# Patient Record
Sex: FEMALE | Race: WHITE | NOT HISPANIC OR LATINO | URBAN - METROPOLITAN AREA
[De-identification: names, ages, dates, MRNs, and addresses within clinical notes are randomized per-mention and may not be internally consistent; named-entity substitution may affect disease eponyms.]

---

## 2017-01-26 ENCOUNTER — APPOINTMENT (OUTPATIENT)
Dept: URBAN - METROPOLITAN AREA CLINIC 200 | Age: 72
Setting detail: DERMATOLOGY
End: 2017-02-02

## 2017-01-26 DIAGNOSIS — B02.9 ZOSTER WITHOUT COMPLICATIONS: ICD-10-CM

## 2017-01-26 PROBLEM — L08.9 LOCAL INFECTION OF THE SKIN AND SUBCUTANEOUS TISSUE, UNSPECIFIED: Status: ACTIVE | Noted: 2017-01-26

## 2017-01-26 PROCEDURE — OTHER PRESCRIPTION: OTHER

## 2017-01-26 PROCEDURE — 99212 OFFICE O/P EST SF 10 MIN: CPT

## 2017-01-26 RX ORDER — VALACYCLOVIR HYDROCHLORIDE 1 G/1
TABLET, FILM COATED ORAL
Qty: 21 | Refills: 0 | Status: ERX | COMMUNITY
Start: 2017-01-26

## 2017-01-26 ASSESSMENT — LOCATION SIMPLE DESCRIPTION DERM: LOCATION SIMPLE: RIGHT LIP

## 2017-01-26 ASSESSMENT — LOCATION DETAILED DESCRIPTION DERM: LOCATION DETAILED: RIGHT UPPER CUTANEOUS LIP

## 2017-01-26 ASSESSMENT — LOCATION ZONE DERM: LOCATION ZONE: LIP

## 2017-04-11 ENCOUNTER — APPOINTMENT (OUTPATIENT)
Dept: URBAN - METROPOLITAN AREA CLINIC 200 | Age: 72
Setting detail: DERMATOLOGY
End: 2017-04-18

## 2017-04-11 DIAGNOSIS — L82.0 INFLAMED SEBORRHEIC KERATOSIS: ICD-10-CM

## 2017-04-11 PROBLEM — D48.5 NEOPLASM OF UNCERTAIN BEHAVIOR OF SKIN: Status: ACTIVE | Noted: 2017-04-11

## 2017-04-11 PROBLEM — I10 ESSENTIAL (PRIMARY) HYPERTENSION: Status: ACTIVE | Noted: 2017-04-11

## 2017-04-11 PROCEDURE — 11100: CPT

## 2017-04-11 PROCEDURE — OTHER BIOPSY BY SHAVE METHOD: OTHER

## 2017-04-11 ASSESSMENT — LOCATION DETAILED DESCRIPTION DERM: LOCATION DETAILED: LEFT INFERIOR LATERAL FOREHEAD

## 2017-04-11 ASSESSMENT — LOCATION SIMPLE DESCRIPTION DERM: LOCATION SIMPLE: LEFT FOREHEAD

## 2017-04-11 ASSESSMENT — LOCATION ZONE DERM: LOCATION ZONE: FACE

## 2017-04-11 NOTE — PROCEDURE: BIOPSY BY SHAVE METHOD
Type Of Destruction Used: Curettage
Dressing: bandage
Render Post-Care Instructions In Note?: no
Wound Care: Vaseline
consent was obtained and risks were reviewed including but not limited to scarring, infection, bleeding, scabbing, incomplete removal, nerve damage and allergy to anesthesia.
Additional Anesthesia Volume In Cc (Will Not Render If 0): 0
Biopsy Method: Personna blade
Notification Instructions: Patient will be notified of biopsy results. However, patient instructed to call the office if not contacted within 2 weeks.
Anesthesia Volume In Cc (Will Not Render If 0): 0.1
Detail Level: Detailed
Hemostasis: Drysol
Post-Care Instructions: I reviewed with the patient in detail post-care instructions. Patient is to keep the biopsy site dry overnight, and then apply bacitracin twice daily until healed. Patient may apply hydrogen peroxide soaks to remove any crusting.
Billing Type: Third-Party Bill
Anesthesia Type: 1% lidocaine with epinephrine
Biopsy Type: H and E
Size Of Lesion In Cm: 1.2

## 2017-05-11 ENCOUNTER — APPOINTMENT (OUTPATIENT)
Dept: URBAN - METROPOLITAN AREA CLINIC 200 | Age: 72
Setting detail: DERMATOLOGY
End: 2017-05-17

## 2017-05-11 DIAGNOSIS — L57.8 OTHER SKIN CHANGES DUE TO CHRONIC EXPOSURE TO NONIONIZING RADIATION: ICD-10-CM

## 2017-05-11 PROCEDURE — OTHER COUNSELING: OTHER

## 2017-05-11 PROCEDURE — 99213 OFFICE O/P EST LOW 20 MIN: CPT

## 2017-05-11 ASSESSMENT — LOCATION SIMPLE DESCRIPTION DERM: LOCATION SIMPLE: RIGHT UPPER BACK

## 2017-05-11 ASSESSMENT — LOCATION ZONE DERM: LOCATION ZONE: TRUNK

## 2017-05-11 ASSESSMENT — LOCATION DETAILED DESCRIPTION DERM: LOCATION DETAILED: RIGHT INFERIOR UPPER BACK

## 2018-06-20 ENCOUNTER — OFFICE VISIT (OUTPATIENT)
Dept: UROGYNECOLOGY | Facility: CLINIC | Age: 73
End: 2018-06-20
Payer: COMMERCIAL

## 2018-06-20 VITALS — WEIGHT: 156 LBS

## 2018-06-20 DIAGNOSIS — N30.10 INTERSTITIAL CYSTITIS: ICD-10-CM

## 2018-06-20 DIAGNOSIS — N39.46 MIXED STRESS AND URGE URINARY INCONTINENCE: ICD-10-CM

## 2018-06-20 DIAGNOSIS — R15.9 FECAL INCONTINENCE ALTERNATING WITH CONSTIPATION: ICD-10-CM

## 2018-06-20 DIAGNOSIS — K59.00 FECAL INCONTINENCE ALTERNATING WITH CONSTIPATION: ICD-10-CM

## 2018-06-20 DIAGNOSIS — R30.0 DYSURIA: ICD-10-CM

## 2018-06-20 DIAGNOSIS — R14.0 ABDOMINAL BLOATING: Primary | ICD-10-CM

## 2018-06-20 PROCEDURE — 51700 IRRIGATION OF BLADDER: CPT | Performed by: OBSTETRICS & GYNECOLOGY

## 2018-06-20 PROCEDURE — 99244 OFF/OP CNSLTJ NEW/EST MOD 40: CPT | Mod: 25 | Performed by: OBSTETRICS & GYNECOLOGY

## 2018-06-20 RX ORDER — OMEPRAZOLE 20 MG/1
20 CAPSULE, DELAYED RELEASE ORAL
COMMUNITY

## 2018-06-20 RX ORDER — BUSPIRONE HYDROCHLORIDE 5 MG/1
10 TABLET ORAL AS NEEDED
COMMUNITY
End: 2022-07-14

## 2018-06-20 RX ORDER — ROSUVASTATIN CALCIUM 5 MG/1
10 TABLET, COATED ORAL DAILY
COMMUNITY

## 2018-06-20 RX ORDER — FLUOXETINE HYDROCHLORIDE 20 MG/1
20 CAPSULE ORAL 2 TIMES DAILY
COMMUNITY

## 2018-06-20 RX ORDER — AMLODIPINE BESYLATE 10 MG/1
10 TABLET ORAL DAILY
COMMUNITY

## 2018-06-20 RX ORDER — LEVOTHYROXINE SODIUM 88 UG/1
75 TABLET ORAL
COMMUNITY
End: 2021-04-14

## 2018-06-20 RX ORDER — LISINOPRIL 5 MG/1
5 TABLET ORAL DAILY
COMMUNITY

## 2018-06-20 ASSESSMENT — ENCOUNTER SYMPTOMS
BACK PAIN: 1
DIARRHEA: 1
FATIGUE: 1
HEMATURIA: 0
DIFFICULTY URINATING: 0
NERVOUS/ANXIOUS: 1
DYSURIA: 1
FREQUENCY: 1
ABDOMINAL PAIN: 0
VOMITING: 0
BLOOD IN STOOL: 0
FEVER: 0
NAUSEA: 0
CHILLS: 0
CONSTIPATION: 1
APPETITE CHANGE: 0
FLANK PAIN: 1

## 2018-06-20 NOTE — PATIENT INSTRUCTIONS
If you have any problems or concerns, call our office at [phone].  If you think you are having a medical emergency, please call 911.     Bowel Purge with PEG/Electrolyte-Generic, GoLYTELY, or NuLYTELY    You may have a problem emptying your bowels completely or if you have an x-ray that shows your colon is full of stool and you have overflow diarrhea, your doctor may have complete a bowel purge.  The purpose of a bowel purge is to empty the colon of built up stool, this is done before starting a new laxative or simply because your colon is overloaded with stool. Once you start taking the medication for the purge, you will need to be by a bathroom for at least a day or two.    What supplies are needed for your bowel purge?  Fill your prescription for PEG/Electrolyte-Generic®, GoLYTELY®, or NuLYTELY® at your local pharmacy. You can flavor the medication with Crystal Light Lemonade® to make it taste better.    Occasionally, doctors order Magnesium Citrate which is over the counter, you will likely need to drink several bottles.  Do not take this if you have decreased kidney function.    How to take the bowel purge?  Mix solution from pharmacy as directed on the container. Start by Drink one 8 oz. glass of PEG/Electrolyte-Generic, NuLYTELY, GoLYTELY, solution and continue drinking one 8 oz. glass approximately every 15 minutes (or as tolerated to prevent nausea) until 2 liters (64 oz.) of the prep solution is gone. You can either drink the other 2 liters around 5-6 pm the same day or you can complete the remaining 2 liters the next day.  You do not have to stop eating during the purge since it is not for a procedure; eat lightly.  The goal is to get mostly clear from below.  If not, you may need to drink more.    Starting your new laxative or fiber  If your doctor has prescribed a laxative or fiber for your bowels, wait one day after cleaning out and start the medication. Often, over time, you may need to make  adjustments in your daily laxative therapy.     Patient Education   Polyethylene Glycol powder  What is this medicine?  POLYETHYLENE GLYCOL 3350 (zulay ee ETH i pravin; GLYE col) powder is a laxative used to treat constipation. It increases the amount of water in the stool. Bowel movements become easier and more frequent.  This medicine may be used for other purposes; ask your health care provider or pharmacist if you have questions.  COMMON BRAND NAME(S): GaviLax, GIALAX, GlycoLax, MiraLax, Smooth LAX, [facility]  What should I tell my health care provider before I take this medicine?  They need to know if you have any of these conditions:  -a history of blockage of the stomach or intestine  -current abdomen distension or pain  -difficulty swallowing  -diverticulitis, ulcerative colitis, or other chronic bowel disease  -phenylketonuria  -an unusual or allergic reaction to polyethylene glycol, other medicines, dyes, or preservatives  -pregnant or trying to get pregnant  -breast-feeding  How should I use this medicine?  Take this medicine by mouth. The bottle has a measuring cap that is marked with a line. Pour the powder into the cap up to the marked line (the dose is about 1 heaping tablespoon). Add the powder in the cap to a full glass (4 to 8 ounces or 120 to 240 ml) of water, juice, soda, coffee or tea. Mix the powder well. Drink the solution. Take exactly as directed. Do not take your medicine more often than directed.  Talk to your pediatrician regarding the use of this medicine in children. Special care may be needed.  Overdosage: If you think you have taken too much of this medicine contact a poison control center or emergency room at once.  NOTE: This medicine is only for you. Do not share this medicine with others.  What if I miss a dose?  If you miss a dose, take it as soon as you can. If it is almost time for your next dose, take only that dose. Do not take double or extra doses.  What may interact with  this medicine?  Interactions are not expected.  This list may not describe all possible interactions. Give your health care provider a list of all the medicines, herbs, non-prescription drugs, or dietary supplements you use. Also tell them if you smoke, drink alcohol, or use illegal drugs. Some items may interact with your medicine.  What should I watch for while using this medicine?  Do not use for more than 2 weeks without advice from your doctor or health care professional. It can take 2 to 4 days to have a bowel movement and to experience improvement in constipation. See your health care professional for any changes in bowel habits, including constipation, that are severe or last longer than three weeks.  Always take this medicine with plenty of water.  What side effects may I notice from receiving this medicine?  Side effects that you should report to your doctor or health care professional as soon as possible:  -diarrhea  -difficulty breathing  -itching of the skin, hives, or skin rash  -severe bloating, pain, or distension of the stomach  -vomiting  Side effects that usually do not require medical attention (report to your doctor or health care professional if they continue or are bothersome):  -bloating or gas  -lower abdominal discomfort or cramps  -nausea  This list may not describe all possible side effects. Call your doctor for medical advice about side effects. You may report side effects to FDA at 6-795-FDA-8654.  Where should I keep my medicine?  Keep out of the reach of children.  Store between 15 and 30 degrees C (59 and 86 degrees F). Throw away any unused medicine after the expiration date.  NOTE: This sheet is a summary. It may not cover all possible information. If you have questions about this medicine, talk to your doctor, pharmacist, or health care provider.  © 2018 Elsevier/Gold Standard (2009-07-20 16:50:45)       Do Any Foods Affect My Bladder?    Potential Dietary Irritants to the Urinary  Tract  Acidic Foods to be avoided:   All alcoholic beverages   Cranberries   Apples     Grapes   Apple juice     Guava   Cantaloupe     Lemon juice   Carbonated drinks    Peaches   Chilies/Spicy foods   Pineapple   Juniata Foods/Juices   Plums   Coffee     Tomatoes   Strawberries    Onions   Vinegar     Vitamin B complex   Tea    Other Possible Bladder Irritants (Variable)  Spices, especially HOT ones  All wheat, rye, corn, oats, barley, and their derivatives  Grain alcohols  All vegetable fats except olive oil  Schuster family including ground nuts and cocoa beans (chocolate)  How Can I Tell if a Particular Food is an Irritant for Me?  If bladder symptoms are related to dietary factors, strict adherence to a diet which eliminates the above food products should bring significant relief in 10 days.   The proof is resuming your old dietary habits followed by the return of your symptom complex.  Once you are feeling better, you can begin to add these items back into your diet, one thing at a time. This way, if something does cause your symptoms, you will be able to identify what it is. When you do begin to add foods back into your diet, it is crucial that you MAINTAIN A SIGNIFICANT WATER INTAKE. Water should be the majority of what you drink every day.    Are There Substitutions I Can Make in my Diet?  1. Coffee that has the acid removed. KAVA and ROMBATUS are two brands found in grocery stores, as well as cold brew from [employer].  2. Herbal teas provided they don’t contain large amounts of citrus. Weak tea: dunk a tea bag in water four times quickly or use sun-brewed tea.  3. OVALTINE instead of chocolate drinks.  4. Fruit juices: Apricot nectar, pear nectar and papaya juice, watermelon.  5. Late harvest dessert garcía have low acid content.  6. Fructose, as found in SUPEROSE instead of Nutrasweet or Saccharine.  7. Carob for chocolate in recipes.  8. Orange or lime peel scrapings for flavor rather than use white part of  "rind.  9. Pine nuts in place of other types of nuts.      10. Breads made with potato flower, soya flour, or rice flour.    Vitamins:  Vitamin C: only calcium ascorbate co-buffered with calcium carbonate  Vitamin E: take in powdered form instead of oil capsules  The only B vitamin to use is B6    Reproduced from Padma Baldwin, \"On Pelvic Floor Muscle function and Stress Urinary Incontinence: Effects of Posture, Parity and Volitional Control\", Dissertation, [facility], pages 145-157\". No changes in, additions to, or deletions from the text should be made without prior written approval of Padma Baldwin. 2/08     "

## 2018-06-20 NOTE — ASSESSMENT & PLAN NOTE
No evidence of infection at this time and recently treated for UTI with Macrobid. While constipation may be contributory, patient also has a history of Interstitial Cystitis. She is not currently on any medications for this.     Plan to start bladder instillation today and continue weekly for the next 6 weeks. Will reevaluate progress once X-ray with bowel clean out is completed.

## 2018-06-20 NOTE — LETTER
Dear Colleagues:    I had the pleasure of seeing Yusra Walters today at the [facility] office at [facility] on June 20, 2018, at your consultative request.  As you may recall, Ms. Walters is a 72 year-old who presented today with worsening lower urinary tract symptoms as well as abdominal bloating and accidental bowel leakage.    On my examination, Ms. Walters has normal vaginal support and her post void residual was within normal limits.  I suspect her abdominal bloating and possibly fecal incontinence are due to fecal loading, so I ordered an abdominal xray to look for this.  If a heavy stool burden is visualized, I will prescribe GoLytely as a bowel purge.  I also recommended she use a bowel regimen of MiraLAX and fiber to keep her stool soft but bulky.  She has a small rectocele and perineocele, and her sense of outlet obstruction and accidental bowel leakage might improve with treatments (such as a pessary or surgery) to address the posterior vaginal wall prolapse.     We also started her on bladder instillations today, and will plan for 6 total weekly instillations to see if this helps get her lower urinary tract symptoms under control.    Thank you again for your kind consultation.  I have asked Ms. Walters to return to my clinic for a follow-up visit after the 6th bladder instillation, and I will continue to keep you updated regarding her progress.  If you have any questions or concerns, please do not hesitate to call.    Keon Chapa MD, PhD  Female Pelvic Medicine & Reconstructive Surgery  [facility]  [facility]  Phone: [phone]  Fax: [phone]  URL: [email or web address]           [email or web address]

## 2018-06-20 NOTE — ASSESSMENT & PLAN NOTE
Constipation long standing and suspect contributory to bloating. Recent colonoscopy WNL per patient.     Will obtain abdominal X-Ray to evaluate for extent of stool trapping. Discussed with patient that once we have results, may use a bowel prep in order to clean out bowels. This may be worsening her sense or urinary urgency as there does not seem to be a concurrent infection at this time.     Also recommended starting Miralax and continuing her Fiber supplement in addition to adequate hydration. Discussed that the finding of rectocele and perineocele may be able to be treated with a pessary or posterior colpoperineorrhaphy in the future.

## 2018-06-20 NOTE — ASSESSMENT & PLAN NOTE
Patient previously treated with vaginal mesh 15 years ago although she is unaware of what the specifics of her procedure were.     Will complete above mentioned testing and evaluation , and if continuation of urge incontinence, could consider Interstim.

## 2018-06-20 NOTE — PROGRESS NOTES
Ms. Walters is seen in consultation regarding a new onset of dysuria for the past 3-4 weeks and a known history of Interstitial Cystitis in addition to fecal incontinence at the consultative request of Dr. Garcia. She was previously seen and treated by Dr. Muro but has not seen him for the past 15 years.     CC: Dysuria, urinary frequency, fecal incontinence    HPI: This is a 72 y.o.  who presents to discuss return of dysuria and urinary frequency for the past 3-4 weeks. Was treated by her PCP for a UTI with 10 days of macrobid, however is still noticing that she is voiding up to 25 times a day. This was her first UTI  in almost 15 years.     She reports urinary urgency outside of this one month time frame for the past 15 years where she rushes to void at least 15 times per day. She uses 3 -4 pads per day for protection and notes that she does leak urine several times per day due to urge incontinence. She also notes OJSE RAMON several times per week.    She reports nocturnal enuresis and awakens 1-2 times per night to void.  She denies recurrent or frequent urinary tract infections, and had this single urinary tract infections in the last year.    Prolapse Symptoms: none     Bowel Symptoms: Daily bowel movement, sometimes soft and unformed, sometimes very hard and constipated.  She denies splinting.  She reports fecal incontinence daily. She notes that it is usually soft in consistency without any specific predicating factors. It can happen just walking around or at rest and has been going on for one year.     Sexual Function: She is sexually active.     She  has a past medical history of Anxiety; Disease of thyroid gland; Elevated lipids; GERD (gastroesophageal reflux disease); and Hypertension.    She  has a past surgical history that includes Bladder suspension and Breast cyst excision.      Current Outpatient Prescriptions:   •  amLODIPine (NORVASC) 10 mg tablet, Take 10 mg by mouth daily., Disp: , Rfl:   •   busPIRone (BUSPAR) 5 mg tablet, Take 5 mg by mouth 3 (three) times a day., Disp: , Rfl:   •  FLUoxetine (PROzac) 20 mg capsule, Take 20 mg by mouth daily., Disp: , Rfl:   •  levothyroxine (SYNTHROID) 88 mcg tablet, Take 88 mcg by mouth daily., Disp: , Rfl:   •  lisinopril (PRINIVIL) 5 mg tablet, Take 5 mg by mouth daily., Disp: , Rfl:   •  omeprazole (PriLOSEC) 20 mg capsule, Take 20 mg by mouth daily before breakfast., Disp: , Rfl:   •  rosuvastatin (CRESTOR) 5 mg tablet, Take 5 mg by mouth daily., Disp: , Rfl:     She has No Known Allergies.    Her social history is not contributory.    Her family history is not contributory.    Review of Systems    Review of Systems   Constitutional: Positive for fatigue. Negative for appetite change, chills and fever.   Gastrointestinal: Positive for constipation and diarrhea. Negative for abdominal pain, blood in stool, nausea and vomiting.   Genitourinary: Positive for dysuria, flank pain, frequency, pelvic pain and urgency. Negative for difficulty urinating and hematuria.   Musculoskeletal: Positive for back pain.   Psychiatric/Behavioral: The patient is nervous/anxious.        Physical Exam    There were no vitals filed for this visit.  Weight: 70.8 kg (156 lb)   There is no height or weight on file to calculate BMI.   Constitutional: She is oriented to person, place, and time and well-developed, well-nourished, and in no distress.  Psychiatric: Mood, memory, affect and judgment normal.   Neurological: Pleasant and oriented.   Neck: Normal in appearance.   Back: No tenderness over the spine or CVA tenderness.  Cardiovascular: No JVD.  No lower extremity edema.  Pulmonary/Chest: Effort normal. No respiratory distress.   Skin: Skin is warm and dry. No rash noted. No pallor.   Abd: Soft, non tender, mild distention.   No surgical scars visualized.    Pelvic Exam (a female chaperone was present during the entire pelvic examination)    Normal external genitalia, including  clitoris, urethral meatus and perineal body.  JOSE RAMON was not demonstrable with cough in the supine position, approximately 5 minutes after her last spontaneous void.    Physical Exam   Genitourinary: Uterus normal. Pelvic exam was performed with patient in supine position.     External female genitalia normal.   Urethral meatus normal.   Urethra normal.   Cervix exam normal.  Uterus is normal.     POP-Q measurements were:   Aa: -2, Ba: -2, C: -9  gh: 4, pb: 4, TVL: 10  Ap: -1, Bp: -1, D: 10      PROCEDURE NOTE FOR POST-VOID RESIDUAL: (93807)  The urethra was prepped, and a lubricated 12 Vietnamese catheter was inserted to collect a post void residual.  The procedure was well tolerated by the patient  The PVR amount is 15 mL.     POC UA was ordered: negative for heme, negative for LE, negative for nitrites detected.    Speculum examination: Vaginal epithelium was normal in apearance.  The cervix was grossly normal. Perineocele/rectocele present  Bimanual exam: The uterus was midposition.  There were no masses or tenderness in the pelvis/adnexal region.  Rectal exam: decreased resting sphincter tone     Assessment/Plan     Abdominal bloating  Pelvic US ordered to evaluate for any organic pelvic pathology, however suspect due to chronic constipation.     Fecal incontinence alternating with constipation  Constipation long standing and suspect contributory to bloating. Recent colonoscopy WNL per patient.     Will obtain abdominal X-Ray to evaluate for extent of stool trapping. Discussed with patient that once we have results, may use a bowel prep in order to clean out bowels. This may be worsening her sense or urinary urgency as there does not seem to be a concurrent infection at this time.     Also recommended starting Miralax and continuing her Fiber supplement in addition to adequate hydration. Discussed that the finding of rectocele and perineocele may be able to be treated with a pessary or posterior colpoperineorrhaphy in  the future.    Dysuria  No evidence of infection at this time and recently treated for UTI with Macrobid. While constipation may be contributory, patient also has a history of Interstitial Cystitis. She is not currently on any medications for this.     Plan to start bladder instillation today and continue weekly for the next 6 weeks. Will reevaluate progress once X-ray with bowel clean out is completed.     Mixed stress and urge urinary incontinence  Patient previously treated with vaginal mesh 15 years ago although she is unaware of what the specifics of her procedure were.     Will complete above mentioned testing and evaluation , and if continuation of urge incontinence, could consider Interstim.      Return for bladder instillations weekly x 5 more, f/u 6 weeks with Eduard.       Lyly Reddy DO    I performed a history and physical examination of the patient and discussed the management with the Resident. I reviewed the Resident's note and agree with the documented findings and plan of care, except for my comments below or within the additional notes today.    Keon Chapa MD PhD

## 2018-06-20 NOTE — PROGRESS NOTES
Bladder Instillation  #1  30ml Normal Saline  Lot# [id number] Exp 4/16/2020  5ml 2& Lidocaine  Lot#[id number] Exp 7/1/2018  3cc Sod. Chloride 8.4% Lot#[id number] Exp 9/2019  1ml Solu-Medrol 40mg Lot#[id number] Exp 4.2019  1ml Heparin 10,000U/1ml Lot#[id number] Exp 6/2019    MARIE alfred

## 2018-06-20 NOTE — ASSESSMENT & PLAN NOTE
Pelvic US ordered to evaluate for any organic pelvic pathology, however suspect due to chronic constipation.

## 2018-06-22 ENCOUNTER — HOSPITAL ENCOUNTER (OUTPATIENT)
Dept: RADIOLOGY | Facility: HOSPITAL | Age: 73
Discharge: HOME | End: 2018-06-22
Attending: OBSTETRICS & GYNECOLOGY
Payer: COMMERCIAL

## 2018-06-22 ENCOUNTER — TELEPHONE (OUTPATIENT)
Dept: UROGYNECOLOGY | Facility: CLINIC | Age: 73
End: 2018-06-22

## 2018-06-22 DIAGNOSIS — K59.00 FECAL INCONTINENCE ALTERNATING WITH CONSTIPATION: ICD-10-CM

## 2018-06-22 DIAGNOSIS — R14.0 ABDOMINAL BLOATING: ICD-10-CM

## 2018-06-22 DIAGNOSIS — R15.9 FECAL INCONTINENCE ALTERNATING WITH CONSTIPATION: ICD-10-CM

## 2018-06-22 PROCEDURE — 74018 RADEX ABDOMEN 1 VIEW: CPT

## 2018-06-22 PROCEDURE — 76830 TRANSVAGINAL US NON-OB: CPT

## 2018-06-22 RX ORDER — POLYETHYLENE GLYCOL 3350, SODIUM SULFATE ANHYDROUS, SODIUM BICARBONATE, SODIUM CHLORIDE, POTASSIUM CHLORIDE 236; 22.74; 6.74; 5.86; 2.97 G/4L; G/4L; G/4L; G/4L; G/4L
POWDER, FOR SOLUTION ORAL
Qty: 4000 ML | Refills: 0 | Status: SHIPPED | OUTPATIENT
Start: 2018-06-22 | End: 2021-04-14

## 2018-06-22 NOTE — TELEPHONE ENCOUNTER
Patient stopped by , asking for pelvic us results and abdominal xray results,, please call her at  ,  mamadou recommended she stop by the first floor, radiology, and ask for a report, patient refused, which is why I'm forwarding this message.

## 2018-06-22 NOTE — PROGRESS NOTES
I called Ms. Walters to let her know that her pelvic ultrasound was unremarkable, which is good.     Her xray confirmed fecal loading, so I recommend a bowel purge with GoLytely 4 liters, and I sent a prescription for the PEG solution to [facility] in [place].  After she does the bowel purge, she should use MiraLAX and fiber supplements on a regular basis to keep her bowels moving regularly.    She should follow-up with me after she completes 6 weeks of bladder instillations.    Keon Chapa MD PhD

## 2018-06-26 ENCOUNTER — OFFICE VISIT (OUTPATIENT)
Dept: UROGYNECOLOGY | Facility: CLINIC | Age: 73
End: 2018-06-26
Payer: COMMERCIAL

## 2018-06-26 DIAGNOSIS — R30.0 DYSURIA: ICD-10-CM

## 2018-06-26 PROCEDURE — 51700 IRRIGATION OF BLADDER: CPT | Performed by: NURSE PRACTITIONER

## 2018-06-26 NOTE — PROGRESS NOTES
Yusra GRANADOS is here for Bladder Instillation:     UA Dip: NEGATIVE     10ml  Lidocaine HCL 2%:  Lot [id number] EXP:4/2019  Heparin Sodium 10,000u:  Lot:[id number]  EXP: 68/2019  1ml SoluMedrol 40mg:  Lot:*[id number] EXP: 4/2019  3ml Sodium Bicarb: Lot: [id number] EXP:9/2019  30ml Sodium Chloride:  Lot: [id number]  EXP: 4/2020     Yusra GRANADOS

## 2018-07-03 ENCOUNTER — PROCEDURE VISIT (OUTPATIENT)
Dept: UROGYNECOLOGY | Facility: CLINIC | Age: 73
End: 2018-07-03
Payer: COMMERCIAL

## 2018-07-03 DIAGNOSIS — R30.0 DYSURIA: ICD-10-CM

## 2018-07-03 PROCEDURE — 51700 IRRIGATION OF BLADDER: CPT | Performed by: NURSE PRACTITIONER

## 2018-07-03 NOTE — PROGRESS NOTES
Yusra GRANADOS is here for Bladder Instillation:     UA Dip: NEGATIVE     10ml  Lidocaine HCL 2%:  Lot:[id number]  EXP:4/2019  Heparin Sodium 10,000u:  Lot:[id number]  EXP: 6/2019  1ml SoluMedrol 40mg:  Lot:[id number]  EXP: 4/2019  3ml Sodium Bicarb: Lot: [id number] EXP:9/2019  30ml Sodium Chloride:  Lot: [id number] EXP: 4/2020     Yusra GRANADOS to make 1 week appt for next instillation

## 2018-07-09 ENCOUNTER — OFFICE VISIT (OUTPATIENT)
Dept: UROGYNECOLOGY | Facility: CLINIC | Age: 73
End: 2018-07-09
Payer: COMMERCIAL

## 2018-07-09 DIAGNOSIS — N30.10 INTERSTITIAL CYSTITIS: Primary | ICD-10-CM

## 2018-07-09 PROCEDURE — 51700 IRRIGATION OF BLADDER: CPT | Performed by: OBSTETRICS & GYNECOLOGY

## 2018-07-09 NOTE — PROGRESS NOTES
Yusra GRANADOS is here for Bladder Instillation:     UA Dip: NEGATIVE     10ml  Bupivacaine HCL 0.75%:  Lot:[id number] EXP:6/2019  Heparin Sodium 10,000u:  Lot:[id number]  EXP: 6/2019  1ml SoluMedrol 40mg:  Lot:[id number]  EXP: 4/2019  3ml Sodium Bicarb: Lot: [id number]  EXP:9/2019  30ml Sodium Chloride:  Lot: [id number]  EXP: 2/2020     Yusra GRANADOS to make 1 week appt for next instillation

## 2018-07-09 NOTE — PATIENT INSTRUCTIONS
If you have any problems or concerns, call our office at [phone].  If you think you are having a medical emergency, please call [phone].

## 2018-07-13 ENCOUNTER — APPOINTMENT (OUTPATIENT)
Dept: URBAN - METROPOLITAN AREA CLINIC 200 | Age: 73
Setting detail: DERMATOLOGY
End: 2018-07-24

## 2018-07-13 DIAGNOSIS — L82.0 INFLAMED SEBORRHEIC KERATOSIS: ICD-10-CM

## 2018-07-13 DIAGNOSIS — L57.8 OTHER SKIN CHANGES DUE TO CHRONIC EXPOSURE TO NONIONIZING RADIATION: ICD-10-CM

## 2018-07-13 PROCEDURE — 99213 OFFICE O/P EST LOW 20 MIN: CPT

## 2018-07-13 PROCEDURE — OTHER PRESCRIPTION: OTHER

## 2018-07-13 RX ORDER — HYDROQUINONE 4 %
CREAM (GRAM) TOPICAL
Qty: 1 | Refills: 6 | Status: ERX

## 2018-07-13 ASSESSMENT — LOCATION DETAILED DESCRIPTION DERM: LOCATION DETAILED: LEFT INFERIOR MEDIAL FOREHEAD

## 2018-07-13 ASSESSMENT — LOCATION SIMPLE DESCRIPTION DERM: LOCATION SIMPLE: LEFT FOREHEAD

## 2018-07-13 ASSESSMENT — LOCATION ZONE DERM: LOCATION ZONE: FACE

## 2018-07-20 ENCOUNTER — PROCEDURE VISIT (OUTPATIENT)
Dept: UROGYNECOLOGY | Facility: CLINIC | Age: 73
End: 2018-07-20
Payer: COMMERCIAL

## 2018-07-20 DIAGNOSIS — N39.46 MIXED STRESS AND URGE URINARY INCONTINENCE: ICD-10-CM

## 2018-07-20 DIAGNOSIS — N30.10 INTERSTITIAL CYSTITIS: Primary | ICD-10-CM

## 2018-07-20 PROCEDURE — 99212 OFFICE O/P EST SF 10 MIN: CPT | Mod: 25 | Performed by: OBSTETRICS & GYNECOLOGY

## 2018-07-20 PROCEDURE — 51700 IRRIGATION OF BLADDER: CPT | Performed by: OBSTETRICS & GYNECOLOGY

## 2018-07-20 RX ORDER — MIRABEGRON 25 MG/1
25 TABLET, FILM COATED, EXTENDED RELEASE ORAL DAILY
Qty: 90 TABLET | Refills: 1 | Status: SHIPPED | OUTPATIENT
Start: 2018-07-20 | End: 2018-07-24 | Stop reason: SDUPTHER

## 2018-07-20 NOTE — ASSESSMENT & PLAN NOTE
Bladder pressure and pain improving with instillations.  #4 of 6 weekly instillations performed today.  Continue through #6.

## 2018-07-20 NOTE — PROGRESS NOTES
Yusra Walters presents today for follow-up on bladder pain and urinary urgency.    S: Ms. Walters presents today in scheduled follow-up on lower urinary tract symptoms.  Today is her fourth bladder instillation.  She reports that the instillations are helping with her sense of bladder pressure, but she continues to be bothered by the urgency.  She would therefore like to try something to improve the overactive bladder portion of her symptoms.    O: There were no vitals filed for this visit.   There is no height or weight on file to calculate BMI.  Gen: NAD  Further exam deferred    Assessment/Plan     Interstitial cystitis  Bladder pressure and pain improving with instillations.  #4 of 6 weekly instillations performed today.  Continue through #6.    Mixed stress and urge urinary incontinence  Yusra is continuing to be bothered by OAB symptoms.  She has already tried first-line therapies.  We therefore discussed second-line therapies.  I am loathe to start an anti-muscarinic with its risk of constipation since she already had evidence of fecal loading.  As such, I recommended a trial of Mirabegron.  I sent a prescription for Mirabegron 25 mg tabs to her pharmacy, and gave her a week's worth of samples (Lot [id number], Exp 04/2020) to start.      No Follow-up on file.       Keon Chapa MD PhD

## 2018-07-20 NOTE — PROGRESS NOTES
30ml Normal Saline Lot #[id number] Exp 4/16/2020  3ml Sod. Bicarbonate 8.4% Lot# [id number]  Exp 9/1/2019  10ml Bupivicaine 0.75% Lot# [id number] Exp 6/2019  1ml heparin 10,000U.1ml Lot#  [id number] Exp 8/2019  1ml Solu-Medrol 40mg/ml Lot#[id number] Exp 12/2019

## 2018-07-20 NOTE — ASSESSMENT & PLAN NOTE
Yusra is continuing to be bothered by OAB symptoms.  She has already tried first-line therapies.  We therefore discussed second-line therapies.  I am loathe to start an anti-muscarinic with its risk of constipation since she already had evidence of fecal loading.  As such, I recommended a trial of Mirabegron.  I sent a prescription for Mirabegron 25 mg tabs to her pharmacy, and gave her a week's worth of samples (Lot [id number], Exp 04/2020) to start.

## 2018-07-20 NOTE — PATIENT INSTRUCTIONS
If you have any problems or concerns, call our office at [phone].  If you think you are having a medical emergency, please call [phone].

## 2018-07-24 RX ORDER — MIRABEGRON 25 MG/1
25 TABLET, FILM COATED, EXTENDED RELEASE ORAL DAILY
Qty: 90 TABLET | Refills: 1 | Status: SHIPPED | OUTPATIENT
Start: 2018-07-24 | End: 2018-09-19 | Stop reason: ALTCHOICE

## 2018-07-26 ENCOUNTER — PROCEDURE VISIT (OUTPATIENT)
Dept: UROGYNECOLOGY | Facility: CLINIC | Age: 73
End: 2018-07-26
Payer: COMMERCIAL

## 2018-07-26 DIAGNOSIS — N30.10 INTERSTITIAL CYSTITIS: Primary | ICD-10-CM

## 2018-07-26 PROCEDURE — 51700 IRRIGATION OF BLADDER: CPT | Performed by: NURSE PRACTITIONER

## 2018-07-26 PROCEDURE — 99999 PR OFFICE/OUTPT VISIT,PROCEDURE ONLY: CPT | Performed by: NURSE PRACTITIONER

## 2018-07-26 NOTE — PROGRESS NOTES
Yusra GRANADOS is here for Bladder Instillation:     UA Dip: NEGATIVE     10ml  Bupivacaine HCL 0.75%:  Lot:[id number]  EXP:06/2019  Heparin Sodium 10,000u:  Lot:[id number]  EXP: 8/2019  1ml SoluMedrol 40mg:  Lot:[id number]  EXP: 12/2019  3ml Sodium Bicarb: Lot: [id number] EXP:9/2019  30ml Sodium Chloride:  Lot: [id number]  EXP: 4/2020     Yusra GRANADOS to make f/u after vacation

## 2018-08-23 ENCOUNTER — OFFICE VISIT (OUTPATIENT)
Dept: UROGYNECOLOGY | Facility: CLINIC | Age: 73
End: 2018-08-23
Payer: COMMERCIAL

## 2018-08-23 DIAGNOSIS — N30.10 INTERSTITIAL CYSTITIS: Primary | ICD-10-CM

## 2018-08-23 PROCEDURE — 99211 OFF/OP EST MAY X REQ PHY/QHP: CPT | Mod: 25 | Performed by: OBSTETRICS & GYNECOLOGY

## 2018-08-23 PROCEDURE — 51700 IRRIGATION OF BLADDER: CPT | Performed by: OBSTETRICS & GYNECOLOGY

## 2018-08-23 NOTE — PROGRESS NOTES
Yusra GRANADOS is here for Bladder Instillation:     UA Dip: NEGATIVE     10ml  Bupivacaine HCL 0.75%:  Lot:[id number] EXP:6/2019  Heparin Sodium 10,000u:  [id number]  EXP: 8/2019 NDC: 96966-873-58  1ml SoluMedrol 40mg:  Lot:[id number] EXP: 12/2019 NDC: 7002-8601-32  3ml Sodium Bicarb: Lot: [id number] EXP:9/2019  30ml Sodium Chloride:  Lot:[id number]  EXP: 2/2020    Pt to make follow up appt with Dr. Chapa to discuss treatment

## 2018-09-18 ENCOUNTER — OFFICE VISIT (OUTPATIENT)
Dept: UROGYNECOLOGY | Facility: CLINIC | Age: 73
End: 2018-09-18
Payer: COMMERCIAL

## 2018-09-18 ENCOUNTER — TRANSCRIBE ORDERS (OUTPATIENT)
Dept: SCHEDULING | Age: 73
End: 2018-09-18

## 2018-09-18 VITALS
HEIGHT: 65 IN | BODY MASS INDEX: 25.66 KG/M2 | WEIGHT: 154 LBS | SYSTOLIC BLOOD PRESSURE: 110 MMHG | DIASTOLIC BLOOD PRESSURE: 78 MMHG

## 2018-09-18 DIAGNOSIS — N95.0 POSTMENOPAUSAL BLEEDING: Primary | ICD-10-CM

## 2018-09-18 DIAGNOSIS — N30.10 INTERSTITIAL CYSTITIS: ICD-10-CM

## 2018-09-18 PROCEDURE — 99999 PR OFFICE/OUTPT VISIT,PROCEDURE ONLY: CPT | Performed by: NURSE PRACTITIONER

## 2018-09-18 PROCEDURE — 51700 IRRIGATION OF BLADDER: CPT | Performed by: NURSE PRACTITIONER

## 2018-09-18 NOTE — PROGRESS NOTES
Pt here to follow up with IC.  Still has freqauency    UA dip: Armen GRANADOS is here for Bladder Instillation:     UA Dip: NEGATIVE     10ml  Bupivacaine HCL 0.75%:  Lot:[id number]  EXP:6/2019  Heparin Sodium 10,000u:  Lot:[id number]  EXP: 4/2020 NDC: 54359-548-88  1ml SoluMedrol 40mg:  Lot: [id number]  EXP: 1/2020 NDC: 5375-6704-40  3ml Sodium Bicarb: Lot:[id number]  EXP:9/2019  30ml Sodium Chloride:  Lot: [id number]  EXP: 4/2020

## 2018-09-18 NOTE — PROGRESS NOTES
"Patient ID: Yusra Walters   : 1945  MRN: [id number]   Visit Date: 2018    Subjective   Yusra Walters is presenting today for Interstitial Cystitis and Urge Incontinenece  20  Yusra GRANADOS feels she is better since her last visit. She is currently taking Myrbetriq 25mg. She reports wearing 2 pads per day, nocturia x2, frequency x20   Objective   Vital Signs for this encounter: /78   Ht 1.651 m (5' 5\")   Wt 69.9 kg (154 lb)   BMI 25.63 kg/m²   The following have been marked reviewed and updated as appropriate in this visit:  Tobacco  Allergies  Meds  Problems  Med Hx  Surg Hx  Fam Hx  Soc Hx        OB History      Para Term  AB Living    2         2    SAB TAB Ectopic Multiple Live Births            2        Review of Systems  OBGyn Exam     Assessment/Plan   Impression & Plan:  No problem-specific Assessment & Plan notes found for this encounter.    No Follow-up on file.  "

## 2018-09-24 ENCOUNTER — HOSPITAL ENCOUNTER (OUTPATIENT)
Dept: RADIOLOGY | Facility: HOSPITAL | Age: 73
Discharge: HOME | End: 2018-09-24
Attending: NURSE PRACTITIONER
Payer: COMMERCIAL

## 2018-09-24 DIAGNOSIS — N95.0 POSTMENOPAUSAL BLEEDING: ICD-10-CM

## 2018-09-24 PROCEDURE — 76830 TRANSVAGINAL US NON-OB: CPT

## 2018-10-05 ENCOUNTER — HOSPITAL ENCOUNTER (OUTPATIENT)
Facility: HOSPITAL | Age: 73
Setting detail: HOSPITAL OUTPATIENT SURGERY
End: 2018-10-05
Attending: OBSTETRICS & GYNECOLOGY | Admitting: OBSTETRICS & GYNECOLOGY
Payer: COMMERCIAL

## 2018-10-24 ENCOUNTER — TELEPHONE (OUTPATIENT)
Dept: SCHEDULING | Facility: CLINIC | Age: 73
End: 2018-10-24

## 2018-10-24 NOTE — TELEPHONE ENCOUNTER
cardiologist Dr Rangel  labs showed elevated cholesterol, loop recorder in place.  Patient not happy [facility] cardiologist. Please call Yusra with an appointment at [facility] preferred or [facility] with Dr Noriega. Call [phone]

## 2018-11-19 ENCOUNTER — TRANSCRIBE ORDERS (OUTPATIENT)
Dept: SCHEDULING | Age: 73
End: 2018-11-19

## 2018-11-19 DIAGNOSIS — R10.84 GENERALIZED ABDOMINAL PAIN: Primary | ICD-10-CM

## 2018-11-26 ENCOUNTER — HOSPITAL ENCOUNTER (OUTPATIENT)
Dept: RADIOLOGY | Facility: HOSPITAL | Age: 73
Discharge: HOME | End: 2018-11-26
Attending: INTERNAL MEDICINE
Payer: COMMERCIAL

## 2018-11-26 DIAGNOSIS — R10.84 GENERALIZED ABDOMINAL PAIN: ICD-10-CM

## 2018-11-26 PROCEDURE — 63600105 HC IODINE BASED CONTRAST

## 2018-11-26 PROCEDURE — 25500000 HC DRUGS/INCIDENT RAD

## 2018-11-26 PROCEDURE — 74177 CT ABD & PELVIS W/CONTRAST: CPT

## 2018-11-26 RX ADMIN — IOHEXOL 115 ML: 300 INJECTION, SOLUTION INTRAVENOUS at 13:17

## 2018-11-26 RX ADMIN — BARIUM SULFATE 900 ML: 21 SUSPENSION ORAL at 12:00

## 2018-12-10 ENCOUNTER — TRANSCRIBE ORDERS (OUTPATIENT)
Dept: SCHEDULING | Age: 73
End: 2018-12-10

## 2018-12-10 DIAGNOSIS — Z12.31 ENCOUNTER FOR SCREENING MAMMOGRAM FOR MALIGNANT NEOPLASM OF BREAST: Primary | ICD-10-CM

## 2018-12-10 DIAGNOSIS — E04.1 NONTOXIC SINGLE THYROID NODULE: ICD-10-CM

## 2018-12-10 DIAGNOSIS — E03.9 HYPOTHYROIDISM: Primary | ICD-10-CM

## 2018-12-12 ENCOUNTER — HOSPITAL ENCOUNTER (OUTPATIENT)
Dept: RADIOLOGY | Facility: HOSPITAL | Age: 73
Discharge: HOME | End: 2018-12-12
Attending: NURSE PRACTITIONER
Payer: COMMERCIAL

## 2018-12-12 ENCOUNTER — TRANSCRIBE ORDERS (OUTPATIENT)
Dept: RADIOLOGY | Facility: HOSPITAL | Age: 73
End: 2018-12-12

## 2018-12-12 DIAGNOSIS — Z12.31 ENCOUNTER FOR SCREENING MAMMOGRAM FOR MALIGNANT NEOPLASM OF BREAST: ICD-10-CM

## 2018-12-12 PROCEDURE — 77063 BREAST TOMOSYNTHESIS BI: CPT

## 2019-01-09 ENCOUNTER — HOSPITAL ENCOUNTER (OUTPATIENT)
Dept: RADIOLOGY | Facility: HOSPITAL | Age: 74
Discharge: HOME | End: 2019-01-09
Attending: INTERNAL MEDICINE
Payer: MEDICARE

## 2019-01-09 DIAGNOSIS — E04.1 NONTOXIC SINGLE THYROID NODULE: ICD-10-CM

## 2019-01-09 DIAGNOSIS — E03.9 HYPOTHYROIDISM: ICD-10-CM

## 2019-01-09 PROCEDURE — 76536 US EXAM OF HEAD AND NECK: CPT

## 2019-03-06 ENCOUNTER — TRANSCRIBE ORDERS (OUTPATIENT)
Dept: SCHEDULING | Age: 74
End: 2019-03-06

## 2019-03-07 ENCOUNTER — TRANSCRIBE ORDERS (OUTPATIENT)
Dept: SCHEDULING | Age: 74
End: 2019-03-07

## 2019-03-07 DIAGNOSIS — K82.4 CHOLESTEROLOSIS OF GALLBLADDER: Primary | ICD-10-CM

## 2019-03-11 ENCOUNTER — HOSPITAL ENCOUNTER (OUTPATIENT)
Dept: RADIOLOGY | Facility: HOSPITAL | Age: 74
Discharge: HOME | End: 2019-03-11
Attending: INTERNAL MEDICINE
Payer: MEDICARE

## 2019-03-11 DIAGNOSIS — K82.4 CHOLESTEROLOSIS OF GALLBLADDER: ICD-10-CM

## 2019-03-11 PROCEDURE — 76700 US EXAM ABDOM COMPLETE: CPT

## 2019-03-13 ENCOUNTER — APPOINTMENT (OUTPATIENT)
Dept: URBAN - METROPOLITAN AREA CLINIC 200 | Age: 74
Setting detail: DERMATOLOGY
End: 2019-03-19

## 2019-03-13 DIAGNOSIS — S90.1 CONTUSION OF TOE WITHOUT DAMAGE TO NAIL: ICD-10-CM

## 2019-03-13 DIAGNOSIS — L82.1 OTHER SEBORRHEIC KERATOSIS: ICD-10-CM

## 2019-03-13 DIAGNOSIS — D36.1 BENIGN NEOPLASM OF PERIPHERAL NERVES AND AUTONOMIC NERVOUS SYSTEM: ICD-10-CM

## 2019-03-13 PROBLEM — L55.1 SUNBURN OF SECOND DEGREE: Status: ACTIVE | Noted: 2019-03-13

## 2019-03-13 PROBLEM — D48.5 NEOPLASM OF UNCERTAIN BEHAVIOR OF SKIN: Status: ACTIVE | Noted: 2019-03-13

## 2019-03-13 PROBLEM — J30.1 ALLERGIC RHINITIS DUE TO POLLEN: Status: ACTIVE | Noted: 2019-03-13

## 2019-03-13 PROBLEM — S90.111A CONTUSION OF RIGHT GREAT TOE WITHOUT DAMAGE TO NAIL, INITIAL ENCOUNTER: Status: ACTIVE | Noted: 2019-03-13

## 2019-03-13 PROBLEM — I10 ESSENTIAL (PRIMARY) HYPERTENSION: Status: ACTIVE | Noted: 2019-03-13

## 2019-03-13 PROCEDURE — 99212 OFFICE O/P EST SF 10 MIN: CPT | Mod: 25

## 2019-03-13 PROCEDURE — OTHER BIOPSY BY SHAVE METHOD: OTHER

## 2019-03-13 PROCEDURE — OTHER LIQUID NITROGEN (COSMETIC): OTHER

## 2019-03-13 PROCEDURE — 11102 TANGNTL BX SKIN SINGLE LES: CPT

## 2019-03-13 PROCEDURE — OTHER COUNSELING: OTHER

## 2019-03-13 PROCEDURE — OTHER MIPS QUALITY: OTHER

## 2019-03-13 ASSESSMENT — LOCATION DETAILED DESCRIPTION DERM
LOCATION DETAILED: LEFT INFERIOR LATERAL UPPER BACK
LOCATION DETAILED: LEFT MID-UPPER BACK
LOCATION DETAILED: MONS PUBIS
LOCATION DETAILED: INFERIOR THORACIC SPINE
LOCATION DETAILED: RIGHT GREAT TOENAIL
LOCATION DETAILED: RIGHT LATERAL UPPER BACK
LOCATION DETAILED: SUBXIPHOID
LOCATION DETAILED: RIGHT INFERIOR LATERAL UPPER BACK
LOCATION DETAILED: RIGHT MID-UPPER BACK
LOCATION DETAILED: LEFT MEDIAL UPPER BACK
LOCATION DETAILED: LEFT CLAVICULAR NECK

## 2019-03-13 ASSESSMENT — LOCATION SIMPLE DESCRIPTION DERM
LOCATION SIMPLE: UPPER BACK
LOCATION SIMPLE: RIGHT GREAT TOE
LOCATION SIMPLE: LEFT ANTERIOR NECK
LOCATION SIMPLE: GROIN
LOCATION SIMPLE: ABDOMEN
LOCATION SIMPLE: LEFT UPPER BACK
LOCATION SIMPLE: RIGHT UPPER BACK

## 2019-03-13 ASSESSMENT — LOCATION ZONE DERM
LOCATION ZONE: TOENAIL
LOCATION ZONE: TRUNK
LOCATION ZONE: NECK
LOCATION ZONE: VULVA

## 2019-03-13 NOTE — PROCEDURE: LIQUID NITROGEN (COSMETIC)
Detail Level: Generalized
Render Post-Care Instructions In Note?: no
Price (Use Numbers Only, No Special Characters Or $): 75
Consent: The patient's consent was obtained including but not limited to risks of crusting, scabbing, blistering, scarring, darker or lighter pigmentary change, recurrence, incomplete removal and infection. The patient understands that the procedure is cosmetic in nature and is not covered by insurance.
Post-Care Instructions: I reviewed with the patient in detail post-care instructions. Patient is to wear sunprotection, and avoid picking at any of the treated lesions. Pt may apply Vaseline to crusted or scabbing areas.

## 2019-03-13 NOTE — PROCEDURE: BIOPSY BY SHAVE METHOD
Electrodesiccation Text: The wound bed was treated with electrodesiccation after the biopsy was performed.
Dressing: bandage
Wound Care: No ointment
Anesthesia Volume In Cc (Will Not Render If 0): 0.5
Billing Type: Third-Party Bill
Was A Bandage Applied: Yes
Destruction After The Procedure: No
Type Of Destruction Used: Curettage
Curettage Text: The wound bed was treated with curettage after the biopsy was performed.
Post-Care Instructions: I reviewed with the patient in detail post-care instructions. Patient is to keep the biopsy site dry overnight, and then apply bacitracin twice daily until healed. Patient may apply hydrogen peroxide soaks to remove any crusting.
Electrodesiccation And Curettage Text: The wound bed was treated with electrodesiccation and curettage after the biopsy was performed.
Notification Instructions: Patient will be notified of biopsy results. However, patient instructed to call the office if not contacted within 2 weeks.
Depth Of Biopsy: dermis
X Size Of Lesion In Cm: 0
Cryotherapy Text: The wound bed was treated with cryotherapy after the biopsy was performed.
Biopsy Method: Dermablade
Biopsy Type: H and E
Hemostasis: Drysol
Silver Nitrate Text: The wound bed was treated with silver nitrate after the biopsy was performed.
Consent: Written consent was obtained and risks were reviewed including but not limited to scarring, infection, bleeding, scabbing, incomplete removal, nerve damage and allergy to anesthesia.
Detail Level: Detailed

## 2019-06-07 ENCOUNTER — APPOINTMENT (OUTPATIENT)
Dept: URBAN - METROPOLITAN AREA CLINIC 200 | Age: 74
Setting detail: DERMATOLOGY
End: 2019-06-24

## 2019-06-07 DIAGNOSIS — L57.8 OTHER SKIN CHANGES DUE TO CHRONIC EXPOSURE TO NONIONIZING RADIATION: ICD-10-CM

## 2019-06-07 PROCEDURE — OTHER MIPS QUALITY: OTHER

## 2019-06-07 PROCEDURE — OTHER COUNSELING: OTHER

## 2019-06-07 PROCEDURE — 99213 OFFICE O/P EST LOW 20 MIN: CPT

## 2019-06-07 ASSESSMENT — LOCATION DETAILED DESCRIPTION DERM
LOCATION DETAILED: LEFT SUPERIOR MEDIAL UPPER BACK
LOCATION DETAILED: LEFT MEDIAL SUPERIOR CHEST

## 2019-06-07 ASSESSMENT — LOCATION SIMPLE DESCRIPTION DERM
LOCATION SIMPLE: LEFT UPPER BACK
LOCATION SIMPLE: CHEST

## 2019-06-07 ASSESSMENT — LOCATION ZONE DERM: LOCATION ZONE: TRUNK

## 2019-06-07 NOTE — PROCEDURE: MIPS QUALITY
Quality 474: Zoster Vaccination Status: Shingrix vaccine was not administered for reasons documented by clinician (e.g. patient administered vaccine other than Shingrix, patient allergy or other medical reasons, patient declined or other patient reasons, vaccine not available or other system reasons)
Additional Notes: Shingles Vaccine was previously administered, however they did not receive the Shringrix shingles vaccine.
Detail Level: Detailed

## 2019-09-12 ENCOUNTER — TRANSCRIBE ORDERS (OUTPATIENT)
Dept: SCHEDULING | Age: 74
End: 2019-09-12

## 2019-09-12 DIAGNOSIS — R53.83 OTHER FATIGUE: ICD-10-CM

## 2019-09-12 DIAGNOSIS — G47.33 OBSTRUCTIVE SLEEP APNEA: Primary | ICD-10-CM

## 2019-09-12 DIAGNOSIS — G47.00 INSOMNIA: ICD-10-CM

## 2019-09-24 ENCOUNTER — HOSPITAL ENCOUNTER (OUTPATIENT)
Dept: SLEEP MEDICINE | Facility: HOSPITAL | Age: 74
Discharge: HOME | End: 2019-09-24
Attending: FAMILY MEDICINE
Payer: MEDICARE

## 2019-09-24 DIAGNOSIS — G47.00 INSOMNIA: ICD-10-CM

## 2019-09-24 DIAGNOSIS — G47.33 OBSTRUCTIVE SLEEP APNEA: ICD-10-CM

## 2019-09-24 DIAGNOSIS — R53.83 OTHER FATIGUE: ICD-10-CM

## 2019-09-24 PROCEDURE — 95810 POLYSOM 6/> YRS 4/> PARAM: CPT

## 2019-10-22 ENCOUNTER — APPOINTMENT (OUTPATIENT)
Dept: URBAN - METROPOLITAN AREA CLINIC 200 | Age: 74
Setting detail: DERMATOLOGY
End: 2019-10-22

## 2019-10-22 DIAGNOSIS — K13.79 OTHER LESIONS OF ORAL MUCOSA: ICD-10-CM

## 2019-10-22 PROBLEM — J30.1 ALLERGIC RHINITIS DUE TO POLLEN: Status: ACTIVE | Noted: 2019-10-22

## 2019-10-22 PROBLEM — K21.9 GASTRO-ESOPHAGEAL REFLUX DISEASE WITHOUT ESOPHAGITIS: Status: ACTIVE | Noted: 2019-10-22

## 2019-10-22 PROCEDURE — OTHER REASSURANCE: OTHER

## 2019-10-22 PROCEDURE — 99212 OFFICE O/P EST SF 10 MIN: CPT

## 2019-10-22 PROCEDURE — OTHER MIPS QUALITY: OTHER

## 2019-10-22 ASSESSMENT — LOCATION DETAILED DESCRIPTION DERM: LOCATION DETAILED: LEFT INFERIOR VERMILION LIP

## 2019-10-22 ASSESSMENT — LOCATION SIMPLE DESCRIPTION DERM: LOCATION SIMPLE: LEFT INFERIOR LIP

## 2019-10-22 ASSESSMENT — LOCATION ZONE DERM: LOCATION ZONE: LIP

## 2019-10-22 NOTE — PROCEDURE: MIPS QUALITY
Additional Notes: Shingles SHINGRIX vaccine not received due to it being on back order.
Quality 474: Zoster Vaccination Status: Shingrix vaccine was not administered for reasons documented by clinician (e.g. patient administered vaccine other than Shingrix, patient allergy or other medical reasons, patient declined or other patient reasons, vaccine not available or other system reasons)
Detail Level: Detailed
Quality 110: Preventive Care And Screening: Influenza Immunization: Influenza Immunization not Administered for Documented Reasons.
Additional Notes: Patient has not received flu shot, but plans to.

## 2019-10-30 ENCOUNTER — HOSPITAL ENCOUNTER (OUTPATIENT)
Dept: SLEEP MEDICINE | Facility: HOSPITAL | Age: 74
Discharge: HOME | End: 2019-10-30
Attending: INTERNAL MEDICINE
Payer: MEDICARE

## 2019-10-30 VITALS — HEIGHT: 65 IN | WEIGHT: 155 LBS | BODY MASS INDEX: 25.83 KG/M2

## 2019-10-30 DIAGNOSIS — G47.33 OBSTRUCTIVE SLEEP APNEA (ADULT) (PEDIATRIC): ICD-10-CM

## 2019-10-30 PROCEDURE — 95811 POLYSOM 6/>YRS CPAP 4/> PARM: CPT

## 2019-11-06 ENCOUNTER — HOSPITAL ENCOUNTER (OUTPATIENT)
Dept: RADIOLOGY | Facility: HOSPITAL | Age: 74
Discharge: HOME | End: 2019-11-06
Attending: INTERNAL MEDICINE
Payer: MEDICARE

## 2019-11-06 ENCOUNTER — TRANSCRIBE ORDERS (OUTPATIENT)
Dept: REGISTRATION | Facility: HOSPITAL | Age: 74
End: 2019-11-06

## 2019-11-06 DIAGNOSIS — R06.02 SHORTNESS OF BREATH: ICD-10-CM

## 2019-11-06 DIAGNOSIS — R06.02 SHORTNESS OF BREATH: Primary | ICD-10-CM

## 2019-11-06 PROCEDURE — 71046 X-RAY EXAM CHEST 2 VIEWS: CPT

## 2019-11-07 ENCOUNTER — TRANSCRIBE ORDERS (OUTPATIENT)
Dept: SCHEDULING | Age: 74
End: 2019-11-07

## 2019-11-07 DIAGNOSIS — R91.1 SOLITARY PULMONARY NODULE: Primary | ICD-10-CM

## 2019-11-07 DIAGNOSIS — R91.8 OTHER NONSPECIFIC ABNORMAL FINDING OF LUNG FIELD: ICD-10-CM

## 2019-11-08 ENCOUNTER — DOCUMENTATION (OUTPATIENT)
Dept: HEMATOLOGY/ONCOLOGY | Facility: HOSPITAL | Age: 74
End: 2019-11-08

## 2019-11-08 ENCOUNTER — HOSPITAL ENCOUNTER (OUTPATIENT)
Dept: RADIOLOGY | Facility: HOSPITAL | Age: 74
Discharge: HOME | End: 2019-11-08
Attending: FAMILY MEDICINE
Payer: MEDICARE

## 2019-11-08 DIAGNOSIS — R91.8 OTHER NONSPECIFIC ABNORMAL FINDING OF LUNG FIELD: ICD-10-CM

## 2019-11-08 DIAGNOSIS — R91.1 SOLITARY PULMONARY NODULE: ICD-10-CM

## 2019-11-08 PROCEDURE — 63600105 HC IODINE BASED CONTRAST: Mod: JW

## 2019-11-08 PROCEDURE — 71260 CT THORAX DX C+: CPT

## 2019-11-08 RX ADMIN — IOHEXOL 75 ML: 350 INJECTION, SOLUTION INTRAVENOUS at 17:15

## 2019-11-08 NOTE — PROGRESS NOTES
Chest X-rays done on 11/6/19 showed a 1.0 cm nodule in her RUL. A Chest CT has been ordered for follow up by her PCP. Will continue to monitor.

## 2019-11-18 ENCOUNTER — HOSPITAL ENCOUNTER (OUTPATIENT)
Dept: RADIOLOGY | Facility: CLINIC | Age: 74
Discharge: HOME | End: 2019-11-18
Attending: INTERNAL MEDICINE
Payer: MEDICARE

## 2019-11-18 VITALS — HEIGHT: 65 IN | WEIGHT: 155 LBS | BODY MASS INDEX: 25.83 KG/M2

## 2019-11-18 DIAGNOSIS — R91.1 LUNG NODULE: ICD-10-CM

## 2019-11-18 RX ORDER — FLUDEOXYGLUCOSE F18 300 MCI/ML
9.03 INJECTION INTRAVENOUS ONCE
Status: COMPLETED | OUTPATIENT
Start: 2019-11-18 | End: 2019-11-18

## 2019-11-18 RX ADMIN — FLUDEOXYGLUCOSE F18 9.03 MILLICURIE: 300 INJECTION INTRAVENOUS at 12:19

## 2020-02-07 ENCOUNTER — TRANSCRIBE ORDERS (OUTPATIENT)
Dept: SCHEDULING | Age: 75
End: 2020-02-07

## 2020-02-07 DIAGNOSIS — Z12.31 ENCOUNTER FOR SCREENING MAMMOGRAM FOR MALIGNANT NEOPLASM OF BREAST: ICD-10-CM

## 2020-02-07 DIAGNOSIS — Z12.39 ENCOUNTER FOR OTHER SCREENING FOR MALIGNANT NEOPLASM OF BREAST: Primary | ICD-10-CM

## 2020-02-10 ENCOUNTER — TRANSCRIBE ORDERS (OUTPATIENT)
Dept: REGISTRATION | Facility: HOSPITAL | Age: 75
End: 2020-02-10

## 2020-02-10 ENCOUNTER — HOSPITAL ENCOUNTER (OUTPATIENT)
Dept: RADIOLOGY | Facility: HOSPITAL | Age: 75
Discharge: HOME | End: 2020-02-10
Attending: FAMILY MEDICINE
Payer: MEDICARE

## 2020-02-10 DIAGNOSIS — Z12.31 ENCOUNTER FOR SCREENING MAMMOGRAM FOR MALIGNANT NEOPLASM OF BREAST: Primary | ICD-10-CM

## 2020-02-10 DIAGNOSIS — R92.8 OTHER ABNORMAL AND INCONCLUSIVE FINDINGS ON DIAGNOSTIC IMAGING OF BREAST: ICD-10-CM

## 2020-02-10 DIAGNOSIS — Z12.31 ENCOUNTER FOR SCREENING MAMMOGRAM FOR MALIGNANT NEOPLASM OF BREAST: ICD-10-CM

## 2020-02-10 DIAGNOSIS — Z12.39 ENCOUNTER FOR OTHER SCREENING FOR MALIGNANT NEOPLASM OF BREAST: ICD-10-CM

## 2020-02-10 PROCEDURE — 77063 BREAST TOMOSYNTHESIS BI: CPT

## 2021-01-04 ENCOUNTER — APPOINTMENT (OUTPATIENT)
Dept: URBAN - METROPOLITAN AREA CLINIC 200 | Age: 76
Setting detail: DERMATOLOGY
End: 2021-01-06

## 2021-01-04 DIAGNOSIS — L82.1 OTHER SEBORRHEIC KERATOSIS: ICD-10-CM

## 2021-01-04 PROCEDURE — OTHER MIPS QUALITY: OTHER

## 2021-01-04 PROCEDURE — OTHER CONSENT FOR TELEMEDICINE VISIT OBTAINED: OTHER

## 2021-01-04 PROCEDURE — OTHER TELEHEALTH RECOMMENDATIONS: OTHER

## 2021-01-04 PROCEDURE — OTHER OTHER: OTHER

## 2021-01-04 PROCEDURE — 99212 OFFICE O/P EST SF 10 MIN: CPT | Mod: 95

## 2021-01-04 PROCEDURE — OTHER TELEHEALTH ASSESSMENT: OTHER

## 2021-01-04 ASSESSMENT — LOCATION SIMPLE DESCRIPTION DERM: LOCATION SIMPLE: RIGHT FOREARM

## 2021-01-04 ASSESSMENT — LOCATION DETAILED DESCRIPTION DERM: LOCATION DETAILED: RIGHT VENTRAL DISTAL FOREARM

## 2021-01-04 ASSESSMENT — LOCATION ZONE DERM: LOCATION ZONE: ARM

## 2021-01-04 NOTE — PROCEDURE: OTHER
Note Text (......Xxx Chief Complaint.): This diagnosis correlates with the
Detail Level: Zone
Render Risk Assessment In Note?: no
Other (Free Text): Check lesion at next skin check.

## 2021-04-07 ENCOUNTER — TRANSCRIBE ORDERS (OUTPATIENT)
Dept: SCHEDULING | Age: 76
End: 2021-04-07

## 2021-04-07 DIAGNOSIS — Z12.31 ENCOUNTER FOR SCREENING MAMMOGRAM FOR MALIGNANT NEOPLASM OF BREAST: Primary | ICD-10-CM

## 2021-04-14 ENCOUNTER — HOSPITAL ENCOUNTER (OUTPATIENT)
Dept: RADIOLOGY | Facility: HOSPITAL | Age: 76
Discharge: HOME | End: 2021-04-14
Attending: OBSTETRICS & GYNECOLOGY
Payer: MEDICARE

## 2021-04-14 ENCOUNTER — OFFICE VISIT (OUTPATIENT)
Dept: ENDOCRINOLOGY | Facility: CLINIC | Age: 76
End: 2021-04-14
Payer: MEDICARE

## 2021-04-14 VITALS
HEIGHT: 65 IN | BODY MASS INDEX: 25.86 KG/M2 | SYSTOLIC BLOOD PRESSURE: 116 MMHG | RESPIRATION RATE: 18 BRPM | HEART RATE: 78 BPM | DIASTOLIC BLOOD PRESSURE: 68 MMHG | OXYGEN SATURATION: 98 % | WEIGHT: 155.2 LBS

## 2021-04-14 DIAGNOSIS — E11.69 TYPE 2 DIABETES MELLITUS WITH OTHER SPECIFIED COMPLICATION, UNSPECIFIED WHETHER LONG TERM INSULIN USE (CMS/HCC): Primary | ICD-10-CM

## 2021-04-14 DIAGNOSIS — Z12.31 ENCOUNTER FOR SCREENING MAMMOGRAM FOR MALIGNANT NEOPLASM OF BREAST: ICD-10-CM

## 2021-04-14 DIAGNOSIS — E03.8 SUBCLINICAL HYPOTHYROIDISM: ICD-10-CM

## 2021-04-14 DIAGNOSIS — K59.00 FECAL INCONTINENCE ALTERNATING WITH CONSTIPATION: ICD-10-CM

## 2021-04-14 DIAGNOSIS — R14.0 ABDOMINAL BLOATING: ICD-10-CM

## 2021-04-14 DIAGNOSIS — R15.9 FECAL INCONTINENCE ALTERNATING WITH CONSTIPATION: ICD-10-CM

## 2021-04-14 DIAGNOSIS — E78.5 HYPERLIPIDEMIA, UNSPECIFIED HYPERLIPIDEMIA TYPE: ICD-10-CM

## 2021-04-14 DIAGNOSIS — N39.46 MIXED STRESS AND URGE URINARY INCONTINENCE: ICD-10-CM

## 2021-04-14 PROBLEM — E04.1 THYROID NODULE: Status: ACTIVE | Noted: 2021-04-14

## 2021-04-14 PROCEDURE — 99204 OFFICE O/P NEW MOD 45 MIN: CPT | Performed by: INTERNAL MEDICINE

## 2021-04-14 PROCEDURE — 77067 SCR MAMMO BI INCL CAD: CPT

## 2021-04-14 RX ORDER — INSULIN PUMP SYRINGE, 3 ML
EACH MISCELLANEOUS
Qty: 1 EACH | Refills: 0 | Status: SHIPPED | OUTPATIENT
Start: 2021-04-14 | End: 2022-04-14

## 2021-04-14 RX ORDER — LANCETS 33 GAUGE
EACH MISCELLANEOUS
Qty: 400 EACH | Refills: 6 | Status: SHIPPED | OUTPATIENT
Start: 2021-04-14

## 2021-04-14 RX ORDER — METHYLPHENIDATE HYDROCHLORIDE 20 MG/1
10 TABLET ORAL DAILY
COMMUNITY
Start: 2021-01-12 | End: 2022-07-14

## 2021-04-14 RX ORDER — BUSPIRONE HYDROCHLORIDE 10 MG/1
10 TABLET ORAL 2 TIMES DAILY
COMMUNITY
Start: 2020-12-18 | End: 2022-07-14

## 2021-04-14 RX ORDER — METFORMIN HYDROCHLORIDE 500 MG/1
500 TABLET ORAL
COMMUNITY
Start: 2021-01-31 | End: 2021-04-14 | Stop reason: SDUPTHER

## 2021-04-14 RX ORDER — METHYLPREDNISOLONE 4 MG/1
TABLET ORAL
COMMUNITY
Start: 2021-02-24 | End: 2021-04-14

## 2021-04-14 RX ORDER — EZETIMIBE 10 MG/1
10 TABLET ORAL NIGHTLY
COMMUNITY

## 2021-04-14 RX ORDER — BLOOD-GLUCOSE METER
KIT MISCELLANEOUS
Qty: 400 STRIP | Refills: 6 | Status: SHIPPED | OUTPATIENT
Start: 2021-04-14 | End: 2022-07-14

## 2021-04-14 RX ORDER — METFORMIN HYDROCHLORIDE 500 MG/1
500 TABLET ORAL 2 TIMES DAILY WITH MEALS
Qty: 180 TABLET | Refills: 3 | Status: SHIPPED | OUTPATIENT
Start: 2021-04-14 | End: 2021-07-14

## 2021-04-14 NOTE — LETTER
April 30, 2021     Nivia Garcia DO  [address]  [place]  [place] [address]    Patient: Yusra Walters  YOB: 1945  Date of Visit: 4/14/2021      Dear Dr. Garcia:    Thank you for referring Yusra Walters to me for evaluation. Below are my notes for this consultation.    If you have questions, please do not hesitate to call me. I look forward to following your patient along with you.         Sincerely,        Sara Suarez MD        CC: Keon Chapa MD PhD  Sara Suarez MD  4/30/2021  7:17 PM  Signed  Subjective      Patient ID: uYsra Walters is a 75 y.o. female with  HTN,   h/o Subclinical Hypothyroidism, thyroid nodule, HLD, referred for Endocrine concerns.    Former pt of Dr. Beth    PCP             Nivia Garcia DO  (PCP for nearly 30 years)  Cardiology: [facility]    Prior Endocrine [facility]--> Demetrius Millan at [facility] since Kirit retired.    HPI   Allergies   Allergen Reactions   • Nsaids (Non-Steroidal Anti-Inflammatory Drug) Other (see comments)     NO NSAIDS OR ASA   Secondary  To previous subarachnoid bleed     Past Surgical History:   Procedure Laterality Date   • BLADDER SUSPENSION     • BREAST CYST EXCISION       SINUS SURGERY: 3 2021: deviated septum repair.  Turbinate surgery for chronic ethmoid, frontal and maxillary sinusitis    Past Medical History:   Diagnosis Date   • Anxiety    • Disease of thyroid gland    • Elevated lipids    • GERD (gastroesophageal reflux disease)    • Hypertension    • Type 2 diabetes mellitus with other specified complication (CMS/[facility]) 4/14/2021     PMHX:    PEREZ: severe, not treated.  Deviated Septum  MDD:  Depression for years on [id number] mg prn  Prozac 20 mg x 2 = 40 mg total x 25 years.  Prior hospitalization for post partum depression.    ?ADD: on Ritalin, tested in the past by psychiatrist in [place].    Syncope: prior  Episode in 2015 with loop monitor, no arrhythmia. Sees   "[facility]    Arthritis:   in R wrist; had surgery L wrist, and had tendon moved.    1 9 2019: Thyroid ultrasound  RIGHT LOBE  3.7 x 0.8 x 1.0 cm.  Normal echogenicity and vascularity without focal mass.     LEFT LOBE  4.1 x 0.7 x 1.4 cm.  Solitary mass.  1.  Medial midpole near the isthmus: 0.5 x 0.3 x 0.4 cm clearly circumscribed  homogeneous hypoechoic solid mass.  No central vascularity.  There may be  peripheral vascularity.  No calcifications.     ISTHMUS  Normal, measuring 0.3 cm.  IMPRESSION:  1.  0.5 cm left thyroid nodule.  2.  Slightly small gland.    Subclinical Hypothyroidism:    Dr Beth weaned her OFF Synthroid as of 2 2020;  Without any change in her chronic fatigue per his notes.  Brain fog a chronic complaint.  Saw Demetrius Millan and he put her on [place] thyroid small dose.  Was on it x 6 months, and went off of it after 3 months.  Brain fog was not any better and he agreed she \"could take it if she wanted and could take it if she needed it.\"  Last took it 3 months ago.    OFF of thyroid hormone x 3 months    Type 2 DM:  DM2 since 2018;  A1c up to 7.2 in 2 13 20 and metformin 500 bid begun  Saw ophtho  3 2021 Dr. Demetrius Foy and [place];  No retinopathy  No numbness in her feet.  Gets some anterior shin numbness only      Date HbA1c KFT     Urine Malb/Cr Lipid/LFT TSH   9 21 18 6.5% Cr 0.74 GFR 80  AST 21, ALT 30* TSH 0.54 on 88---->75   1 19 19 6.5  Yunior 3  TSH 1.33  TPO ab neg  TG Ab neg   4 10 19 6.2% Cr 0.78, K 3.9  Iymy554 HDL 56,  LDL 90 on crstor TSH 1.21 on LT4 50  LFT neg  AST 31*  B12 536  Celiac screen Neg  HLA screen Neg  25 D 55   7 12 19     TSH 2.4 on Syn 25   2 13 20 7.2%  Metformin begun K 3.8 Cr 0.72 GFR 87   TSH 2.94 OFF SYNTHROID   2 24 21  Glu 142 bun 22 Cr 0.83, GFR 69  Na138  Glu 142 K 4.2 Co2 25  Chol 124, HDL TG 87 LDL 58 on Crestor 5+ Zetia 10 mg  LFT normal, ALT 49**  25 D 86    6 29 20 6.0% on metformin bid    TSH 1.21  TG ab  TPO <1  (on [place] x 6 " weeks)   8 19 20     TSH 3.04   2 24 21  K 4.2 alb 4.4   Glu 136*  AST normal  ALT 52* (>29)  WBC 8.4 H/H 12.4/37.8 mcv 99   nnormal diff   3 4 21 A1c 6.5*    TSH 2.56 OFF [place]                 SEE SCANNED info from Dr. Beth data            FHX:  Mother and father with DM2  Mother with MI in mid 80s, and valve replacement  Mother had thyroid disease  Mother breast cancer  Mo and Fa with HLD  Father with CAD  Mother  93 with heart  Children healthy    SHX:  Sporadic w exercise.  2 grown children.   at home. Prior [employer].  Likes to put on music and dance. Liked [employer]  Former smoker, smoked about 15 years; 1ppd x quit in     ROS TODAY:  Transient diarrhea on metformin, but none now  No constipation or abd pain  Had normal colonoscopy in the past in     Had normal DEXA in the past for PCP.    Taking 1000 mg of Calcium  Some prep at bedtime.    No Sob or CP.  No LIEBERMAN    covid vaccin, Moderna x 2    Chronic Fatigue x many years.  Bad sleeper.  Sleep is disrupted. May get 7 hours total, but cant sleep till 2-3 am.  Has DFA.    Severe PEREZ 10 2019 AHI 43   Not treating her PEREZ at this time.    Just had sinus surgery for Deviated septum and multiple infections at [facility] in 3 2021, Kevin Ocampo.  Had Pan Sinusitis.    No choking, hoarseness or trouble swallowing.  No XRT or FHX of thryoid cancer.    Chronic insomnia    Has cold hands and feet  Wt is very stable    Had recurrent sinus infections in the prior to the last A1c 6.5 and steroids.  Now that had sinus surgery should be better.  Had had 3 sinus infections in the last year.    Constitutional: Negative for unexpected weight change.   HENT: Negative for trouble swallowing.    Eyes: Negative for visual disturbance.   Respiratory: Negative for chest tightness.    Cardiovascular: Negative for chest pain and palpitations.   Gastrointestinal: Negative for diarrhea and nausea.   Endocrine: Negative for cold intolerance and heat  "intolerance.   Musculoskeletal: Negative for myalgias.   Skin: Negative for rash.   Neurological: Negative for tremors.   Psychiatric/Behavioral: Negative.        Weight: 70.4 kg (155 lb 3.2 oz)     Wt Readings from Last 10 Encounters:   11/18/19 70.3 kg (155 lb)   10/30/19 70.3 kg (155 lb)   09/18/18 69.9 kg (154 lb)   06/20/18 70.8 kg (156 lb)     Family History   Problem Relation Age of Onset   • Breast cancer Biological Mother    • Colon cancer Biological Brother    • Diabetes Neg Hx          Current Outpatient Medications:   •  amLODIPine (NORVASC) 10 mg tablet, Take 10 mg by mouth daily., Disp: , Rfl:   •  busPIRone (BUSPAR) 5 mg tablet, Take 10 mg by mouth as needed. , Disp: , Rfl:   •  calcium carbonate-vitamin D3 500 mg(1,250mg) -200 unit per tablet, Take 1 tablet by mouth daily., Disp: , Rfl:   •  cholecalciferol, vitamin D3, 1,000 unit tablet, Take 2,000 Units by mouth daily., Disp: , Rfl:   •  FLUoxetine (PROzac) 20 mg capsule, Take 20 mg by mouth 2 (two) times a day. , Disp: , Rfl:   •  levothyroxine (SYNTHROID) 88 mcg tablet, Take 75 mcg by mouth daily. , Disp: , Rfl:   •  lisinopril (PRINIVIL) 5 mg tablet, Take 5 mg by mouth daily., Disp: , Rfl:   •  omeprazole (PriLOSEC) 20 mg capsule, Take 20 mg by mouth daily before breakfast., Disp: , Rfl:   •  polyethylene glycol (GOLYTELY) 236-22.74-6.74 -5.86 gram solution, Drink 2 liters on the first day, and the remaining 2 liters the next day., Disp: 4000 mL, Rfl: 0  •  rosuvastatin (CRESTOR) 5 mg tablet, Take 10 mg by mouth daily. , Disp: , Rfl:   •  vitamin E 200 unit capsule, Take 400 Units by mouth daily., Disp: , Rfl:       Objective    Physical Exam   Vitals:    04/14/21 0806   BP: 116/68   Pulse: 78   Resp: 18   SpO2: 98%   Weight: 70.4 kg (155 lb 3.2 oz)   Height: 1.651 m (5' 5\")     General:     WDWN, in NAD; appearing stated age.    HEENT:      Facies symmetric; Anicteric, without gaze preference                      No thyroid or carotid bruit; no " thyromegaly or nodule                      Moist mucous membranes                      No increased JVP.                      No cervical lymphadenopathy.  COR:           Normal S1, S2; no murmurs gallops or rubs  LUNGS:       Normal effort; Lungs clear   ABDOMEN: NABS: Nontender, nondistended; no HSM or masses.   EXT:            No cyanosis, clubbing or edema or tremor.                      Pedal pulses intact. Capillary refill < 2 seconds.                      No pedal deformities, ulcerations or calluses.                      Monofilament testing is intact both feet.                      Wearing appropriate footwear.  SKIN:          No rash.  PSYCH:      Normal insight and judgment. Alert and O x 3.                          Assessment/Plan     Type 2 diabetes mellitus with other specified complication, unspecified whether long term insulin use (CMS/[facility])    Last A1c 6.2 in 4 2019 upt o 7.2 in 2 13 20  A1c is higher more recenetly at 6.5% but may reflect the sinus infections and steroids prior.  Metformin as below    Has had subcritical stenosis of carotids and fu to cardiology and got better. FU to PCP on this.  Sleep disturbance may  re: raise BG;  Improve sleep hygiene  Needs to increase physical activity.  Has JOSE RAMON would avoid SGLT    Leg Numbness:  unusual numbness in the foreleg is unusual   If  had NORMAL EMG that means that nerves are not likely the problem. You were a smoker.  Depending on how much it is bothering you, you could ask your Cardiologist to consider check of blood vessels or d/w PCP.    PEREZ/Allergies;  MUST fu to Dr Silva  Severe PEREZ may drive up sugars, Bipap and CPAP in past  And really needs to treat this, discussed.  Had AHI of 43 in 2019   ? Nasal pillows may help her  CPAP did not work in the past.    Thyroid nodule:  Sono at [facility] 11 2017: with 5 x 3 x 4 mm nodule at L aspect of isthmus.  This was entirely stable on 2019 ultrasound at [facility].  No fna indicated  Recheck in  2023    Subclinical Hypothyroidism:  For fatigue that was of many years duration had been on 88 mcg which Dr. Beth tapered   OFF entirely as of his LOV with her with normal TSH  Reassess;    Pulmonary Nodule/PEREZ;  To have a fu CAT scan in June 2021 with fu to Dr. Silva in 5 2021    HTN:  Norvasc 10 mg and Lisinopril 5 mg.  Well controlled.  BP Readings from Last 3 Encounters:   04/14/21 116/68   09/18/18 110/78     GERD  Neg celiac tcreening including HLA types in the past  Takes Omeprazole for heart burn  No abd bloat complaint today    INSTRUCTIONS:  1. You can program your devices to block blue light after a certain time.  Go screen free for 2 hours before bed.     2. Sign up online for ONLINE classes for [facility]  If you dont sign up for it in advance, and put it on the schedule it wont happen.     3. Your A1c is higher at 6.5% but may reflect the sinus infections and steroids prior.     4.  You need to discuss ALLERGIES with Dr. Silva when you discuss the sleep apnea with him.  Treating the sleep apnea is the top priority.     4. The unusual numbness in the foreleg is unusual   If you had NORMAL EMG that means that nerves are not likely the problem. You were a smoker.  Depending on how much it is bothering you, you could ask your Cardiologist to consider check of blood vessels.     5. Check Fasting BG and 2 hours post meal x 2 meals to see how high you go       - Hemoglobin A1c; Future  - Microalbumin/Creatinine Ur Random; Future  - Hemoglobin A1c  - Microalbumin/Creatinine Ur Random  - metFORMIN (GLUCOPHAGE) 500 mg tablet; Take 1 tablet (500 mg total) by mouth 2 (two) times a day with meals.  Dispense: 180 tablet; Refill: 3    - TSH w reflex FT4; Future      BW and logs in 3 months time  This note was created with voice recognition software.   Inadvertent dictation errors should be disregarded.   Please contact my office if clarification needed at .

## 2021-04-14 NOTE — PROGRESS NOTES
Subjective      Patient ID: Yusra Walters is a 75 y.o. female with  HTN,   h/o Subclinical Hypothyroidism, thyroid nodule, HLD, referred for Endocrine concerns.    Former pt of Dr. Beth    PCP             Nivia Garcia DO  (PCP for nearly 30 years)  Cardiology: Gautam Howell, [place]    Prior Endocrine Constanting--> Demetrius Millan at [place] since [facility] retired.    HPI   Allergies   Allergen Reactions   • Nsaids (Non-Steroidal Anti-Inflammatory Drug) Other (see comments)     NO NSAIDS OR ASA   Secondary  To previous subarachnoid bleed     Past Surgical History:   Procedure Laterality Date   • BLADDER SUSPENSION     • BREAST CYST EXCISION       SINUS SURGERY: 3 2021: deviated septum repair.  Turbinate surgery for chronic ethmoid, frontal and maxillary sinusitis    Past Medical History:   Diagnosis Date   • Anxiety    • Disease of thyroid gland    • Elevated lipids    • GERD (gastroesophageal reflux disease)    • Hypertension    • Type 2 diabetes mellitus with other specified complication (CMS/HCC) 4/14/2021     PMHX:    PEREZ: severe, not treated.  Deviated Septum  MDD:  Depression for years on [id number] mg prn  Prozac 20 mg x 2 = 40 mg total x 25 years.  Prior hospitalization for post partum depression.    ?ADD: on Ritalin, tested in the past by psychiatrist in [place].    Syncope: prior  Episode in 2015 with loop monitor, no arrhythmia. Sees Dr. Howell    Arthritis:   in R wrist; had surgery L wrist, and had tendon moved.    1 9 2019: Thyroid ultrasound  RIGHT LOBE  3.7 x 0.8 x 1.0 cm.  Normal echogenicity and vascularity without focal mass.     LEFT LOBE  4.1 x 0.7 x 1.4 cm.  Solitary mass.  1.  Medial midpole near the isthmus: 0.5 x 0.3 x 0.4 cm clearly circumscribed  homogeneous hypoechoic solid mass.  No central vascularity.  There may be  peripheral vascularity.  No calcifications.     ISTHMUS  Normal, measuring 0.3 cm.  IMPRESSION:  1.  0.5 cm left thyroid nodule.  2.  Slightly small  "gland.    Subclinical Hypothyroidism:    Dr Beth weaned her OFF Synthroid as of 2020;  Without any change in her chronic fatigue per his notes.  Brain fog a chronic complaint.  Saw Demetrius Millan and he put her on [facility] thyroid small dose.  Was on it x 6 months, and went off of it after 3 months.  Brain fog was not any better and he agreed she \"could take it if she wanted and could take it if she needed it.\"  Last took it 3 months ago.    OFF of thyroid hormone x 3 months    Type 2 DM:  DM2 since ;  A1c up to 7.2 in 2 13 20 and metformin 500 bid begun  Saw ophtho  3 2021 Dr. Demetrius Foy and [place];  No retinopathy  No numbness in her feet.  Gets some anterior shin numbness only      Date HbA1c KFT     Urine Malb/Cr Lipid/LFT TSH   9 21 18 6.5% Cr 0.74 GFR 80  AST 21, ALT 30* TSH 0.54 on 88---->75   1 19 19 6.5  Yunior 3  TSH 1.33  TPO ab neg  TG Ab neg   4 10 19 6.2% Cr 0.78, K 3.9  [id number] HDL 56,  LDL 90 on crstor TSH 1.21 on LT4 50  LFT neg  AST 31*  B12 536  Celiac screen Neg  HLA screen Neg  25 D 55   7 12 19     TSH 2.4 on Syn 25   2 13 20 7.2%  Metformin begun K 3.8 Cr 0.72 GFR 87   TSH 2.94 OFF SYNTHROID   2 24 21  Glu 142 bun 22 Cr 0.83, GFR 69  Na138  Glu 142 K 4.2 Co2 25  Chol 124, HDL TG 87 LDL 58 on Crestor 5+ Zetia 10 mg  LFT normal, ALT 49**  25 D 86    6 29 20 6.0% on metformin bid    TSH 1.21  TG ab  TPO <1  (on Brighton x 6 weeks)   8 19 20     TSH 3.04   2 24 21  K 4.2 alb 4.4   Glu 136*  AST normal  ALT 52* (>29)  WBC 8.4 H/H 12.4/37.8 mcv 99   nnormal diff   3 4 21 A1c 6.5*    TSH 2.56 OFF Brighton                 SEE SCANNED info from Dr. Beth data            FHX:  Mother and father with DM2  Mother with MI in mid 80s, and valve replacement  Mother had thyroid disease  Mother breast cancer  Mo and Fa with HLD  Father with CAD  Mother  93 with heart  Children healthy    SHX:  Sporadic w exercise.  2 grown children.   at home. Prior [employer].  Likes to put on music and dance. Liked [employer] [employer]  Former smoker, smoked about 15 years; 1ppd x quit in 1978    ROS TODAY:  Transient diarrhea on metformin, but none now  No constipation or abd pain  Had normal colonoscopy in the past in 2019    Had normal DEXA in the past for PCP.    Taking 1000 mg of Calcium  Some prep at bedtime.    No Sob or CP.  No LIEBERMAN    covid vaccin, Moderna x 2    Chronic Fatigue x many years.  Bad sleeper.  Sleep is disrupted. May get 7 hours total, but cant sleep till 2-3 am.  Has DFA.    Severe PEREZ 10 2019 AHI 43   Not treating her PEREZ at this time.    Just had sinus surgery for Deviated septum and multiple infections at [facility] in 3 2021, Kevin Ocampo.  Had Pan Sinusitis.    No choking, hoarseness or trouble swallowing.  No XRT or FHX of thryoid cancer.    Chronic insomnia    Has cold hands and feet  Wt is very stable    Had recurrent sinus infections in the prior to the last A1c 6.5 and steroids.  Now that had sinus surgery should be better.  Had had 3 sinus infections in the last year.    Constitutional: Negative for unexpected weight change.   HENT: Negative for trouble swallowing.    Eyes: Negative for visual disturbance.   Respiratory: Negative for chest tightness.    Cardiovascular: Negative for chest pain and palpitations.   Gastrointestinal: Negative for diarrhea and nausea.   Endocrine: Negative for cold intolerance and heat intolerance.   Musculoskeletal: Negative for myalgias.   Skin: Negative for rash.   Neurological: Negative for tremors.   Psychiatric/Behavioral: Negative.        Weight: 70.4 kg (155 lb 3.2 oz)     Wt Readings from Last 10 Encounters:   11/18/19 70.3 kg (155 lb)   10/30/19 70.3 kg (155 lb)   09/18/18 69.9 kg (154 lb)   06/20/18 70.8 kg (156 lb)     Family History   Problem Relation Age of Onset   • Breast cancer Biological Mother    • Colon cancer Biological Brother    • Diabetes Neg Hx          Current Outpatient Medications:   •  amLODIPine  "(NORVASC) 10 mg tablet, Take 10 mg by mouth daily., Disp: , Rfl:   •  busPIRone (BUSPAR) 5 mg tablet, Take 10 mg by mouth as needed. , Disp: , Rfl:   •  calcium carbonate-vitamin D3 500 mg(1,250mg) -200 unit per tablet, Take 1 tablet by mouth daily., Disp: , Rfl:   •  cholecalciferol, vitamin D3, 1,000 unit tablet, Take 2,000 Units by mouth daily., Disp: , Rfl:   •  FLUoxetine (PROzac) 20 mg capsule, Take 20 mg by mouth 2 (two) times a day. , Disp: , Rfl:   •  levothyroxine (SYNTHROID) 88 mcg tablet, Take 75 mcg by mouth daily. , Disp: , Rfl:   •  lisinopril (PRINIVIL) 5 mg tablet, Take 5 mg by mouth daily., Disp: , Rfl:   •  omeprazole (PriLOSEC) 20 mg capsule, Take 20 mg by mouth daily before breakfast., Disp: , Rfl:   •  polyethylene glycol (GOLYTELY) 236-22.74-6.74 -5.86 gram solution, Drink 2 liters on the first day, and the remaining 2 liters the next day., Disp: 4000 mL, Rfl: 0  •  rosuvastatin (CRESTOR) 5 mg tablet, Take 10 mg by mouth daily. , Disp: , Rfl:   •  vitamin E 200 unit capsule, Take 400 Units by mouth daily., Disp: , Rfl:       Objective    Physical Exam   Vitals:    04/14/21 0806   BP: 116/68   Pulse: 78   Resp: 18   SpO2: 98%   Weight: 70.4 kg (155 lb 3.2 oz)   Height: 1.651 m (5' 5\")     General:     WDWN, in NAD; appearing stated age.    HEENT:      Facies symmetric; Anicteric, without gaze preference                      No thyroid or carotid bruit; no thyromegaly or nodule                      Moist mucous membranes                      No increased JVP.                      No cervical lymphadenopathy.  COR:           Normal S1, S2; no murmurs gallops or rubs  LUNGS:       Normal effort; Lungs clear   ABDOMEN: NABS: Nontender, nondistended; no HSM or masses.   EXT:            No cyanosis, clubbing or edema or tremor.                      Pedal pulses intact. Capillary refill < 2 seconds.                      No pedal deformities, ulcerations or calluses.                      Monofilament " testing is intact both feet.                      Wearing appropriate footwear.  SKIN:          No rash.  PSYCH:      Normal insight and judgment. Alert and O x 3.                          Assessment/Plan     Type 2 diabetes mellitus with other specified complication, unspecified whether long term insulin use (CMS/[facility])    Last A1c 6.2 in 4 2019 upt o 7.2 in 2 13 20  A1c is higher more recenetly at 6.5% but may reflect the sinus infections and steroids prior.  Metformin as below    Has had subcritical stenosis of carotids and fu to cardiology and got better. FU to PCP on this.  Sleep disturbance may  re: raise BG;  Improve sleep hygiene  Needs to increase physical activity.  Has JOSE RAMON would avoid SGLT    Leg Numbness:  unusual numbness in the foreleg is unusual   If  had NORMAL EMG that means that nerves are not likely the problem. You were a smoker.  Depending on how much it is bothering you, you could ask your Cardiologist to consider check of blood vessels or d/w PCP.    PEREZ/Allergies;  MUST fu to Dr Silva  Severe PEREZ may drive up sugars, Bipap and CPAP in past  And really needs to treat this, discussed.  Had AHI of 43 in 2019   ? Nasal pillows may help her  CPAP did not work in the past.    Thyroid nodule:  [facility]ono at [facility] 11 2017: with 5 x 3 x 4 mm nodule at L aspect of isthmus.  This was entirely stable on 2019 ultrasound at [facility].  No fna indicated  Recheck in 2023    Subclinical Hypothyroidism:  For fatigue that was of many years duration had been on 88 mcg which Dr. Beth tapered   OFF entirely as of his LOV with her with normal TSH  Reassess;    Pulmonary Nodule/PEREZ;  To have a fu CAT scan in June 2021 with fu to Dr. Silva in 5 2021    HTN:  Norvasc 10 mg and Lisinopril 5 mg.  Well controlled.  BP Readings from Last 3 Encounters:   04/14/21 116/68   09/18/18 110/78     GERD  Neg celiac tcreening including HLA types in the past  Takes Omeprazole for heart burn  No abd bloat complaint  today    INSTRUCTIONS:  1. You can program your devices to block blue light after a certain time.  Go screen free for 2 hours before bed.     2. Sign up online for ONLINE classes for [facility]  If you dont sign up for it in advance, and put it on the schedule it wont happen.     3. Your A1c is higher at 6.5% but may reflect the sinus infections and steroids prior.     4.  You need to discuss ALLERGIES with Dr. Silva when you discuss the sleep apnea with him.  Treating the sleep apnea is the top priority.     4. The unusual numbness in the foreleg is unusual   If you had NORMAL EMG that means that nerves are not likely the problem. You were a smoker.  Depending on how much it is bothering you, you could ask your Cardiologist to consider check of blood vessels.     5. Check Fasting BG and 2 hours post meal x 2 meals to see how high you go       - Hemoglobin A1c; Future  - Microalbumin/Creatinine Ur Random; Future  - Hemoglobin A1c  - Microalbumin/Creatinine Ur Random  - metFORMIN (GLUCOPHAGE) 500 mg tablet; Take 1 tablet (500 mg total) by mouth 2 (two) times a day with meals.  Dispense: 180 tablet; Refill: 3    - TSH w reflex FT4; Future      BW and logs in 3 months time  This note was created with voice recognition software.   Inadvertent dictation errors should be disregarded.   Please contact my office if clarification needed at .

## 2021-04-14 NOTE — PATIENT INSTRUCTIONS
1. You can program your devices to block blue light after a certain time.  Go screen free for 2 hours before bed.    2. Sign up online for ONLINE classes for [facility]  If you dont sign up for it in advance, and put it on the schedule it wont happen.    3. Your A1c is higher at 6.5% but may reflect the sinus infections and steroids prior.    4.  You need to discuss ALLERGIES with Dr. Silva when you discuss the sleep apnea with him.  Treating the sleep apnea is the top priority.    4. The unusual numbness in the foreleg is unusual   If you had NORMAL EMG that means that nerves are not likely the problem. You were a smoker.  Depending on how much it is bothering you, you could ask your Cardiologist to consider check of blood vessels.    5. Check Fasting BG and 2 hours post meal x 2 meals to see how high you go    BW and logs in 3 months time

## 2021-06-15 ENCOUNTER — APPOINTMENT (OUTPATIENT)
Dept: URBAN - METROPOLITAN AREA CLINIC 200 | Age: 76
Setting detail: DERMATOLOGY
End: 2021-06-21

## 2021-06-15 DIAGNOSIS — L91.0 HYPERTROPHIC SCAR: ICD-10-CM

## 2021-06-15 DIAGNOSIS — L82.1 OTHER SEBORRHEIC KERATOSIS: ICD-10-CM

## 2021-06-15 PROCEDURE — OTHER INTRALESIONAL KENALOG: OTHER

## 2021-06-15 PROCEDURE — OTHER COUNSELING: OTHER

## 2021-06-15 PROCEDURE — OTHER REASSURANCE: OTHER

## 2021-06-15 PROCEDURE — 11900 INJECT SKIN LESIONS </W 7: CPT

## 2021-06-15 PROCEDURE — 99212 OFFICE O/P EST SF 10 MIN: CPT | Mod: 25

## 2021-06-15 ASSESSMENT — PAIN INTENSITY VAS: HOW INTENSE IS YOUR PAIN 0 BEING NO PAIN, 10 BEING THE MOST SEVERE PAIN POSSIBLE?: NO PAIN

## 2021-06-15 ASSESSMENT — LOCATION ZONE DERM
LOCATION ZONE: ARM
LOCATION ZONE: TRUNK

## 2021-06-15 ASSESSMENT — LOCATION DETAILED DESCRIPTION DERM
LOCATION DETAILED: LEFT LATERAL PROXIMAL UPPER ARM
LOCATION DETAILED: LEFT MEDIAL SUPERIOR CHEST
LOCATION DETAILED: LEFT VENTRAL PROXIMAL FOREARM

## 2021-06-15 ASSESSMENT — LOCATION SIMPLE DESCRIPTION DERM
LOCATION SIMPLE: LEFT FOREARM
LOCATION SIMPLE: CHEST
LOCATION SIMPLE: LEFT UPPER ARM

## 2021-07-09 ENCOUNTER — TELEPHONE (OUTPATIENT)
Dept: ENDOCRINOLOGY | Facility: CLINIC | Age: 76
End: 2021-07-09

## 2021-07-10 LAB
ALBUMIN/CREAT UR: 7 MCG/MG CREAT
CREAT UR-MCNC: 57 MG/DL (ref 20–275)
HBA1C MFR BLD: 6.3 % OF TOTAL HGB
MICROALBUMIN UR-MCNC: 0.4 MG/DL
TSH SERPL-ACNC: 3.1 MIU/L (ref 0.4–4.5)

## 2021-07-14 ENCOUNTER — OFFICE VISIT (OUTPATIENT)
Dept: ENDOCRINOLOGY | Facility: CLINIC | Age: 76
End: 2021-07-14
Payer: MEDICARE

## 2021-07-14 VITALS
SYSTOLIC BLOOD PRESSURE: 114 MMHG | HEIGHT: 65 IN | BODY MASS INDEX: 26.26 KG/M2 | DIASTOLIC BLOOD PRESSURE: 76 MMHG | WEIGHT: 157.6 LBS | OXYGEN SATURATION: 98 % | RESPIRATION RATE: 18 BRPM

## 2021-07-14 DIAGNOSIS — I10 HYPERTENSION, UNSPECIFIED TYPE: ICD-10-CM

## 2021-07-14 DIAGNOSIS — F41.9 ANXIETY: ICD-10-CM

## 2021-07-14 DIAGNOSIS — N39.46 MIXED STRESS AND URGE URINARY INCONTINENCE: ICD-10-CM

## 2021-07-14 DIAGNOSIS — E11.69 TYPE 2 DIABETES MELLITUS WITH OTHER SPECIFIED COMPLICATION, UNSPECIFIED WHETHER LONG TERM INSULIN USE (CMS/HCC): Primary | ICD-10-CM

## 2021-07-14 DIAGNOSIS — E04.1 THYROID NODULE: ICD-10-CM

## 2021-07-14 LAB — GLUCOSE BLOOD, POC: 126

## 2021-07-14 PROCEDURE — 99214 OFFICE O/P EST MOD 30 MIN: CPT | Performed by: INTERNAL MEDICINE

## 2021-07-14 PROCEDURE — 82962 GLUCOSE BLOOD TEST: CPT | Performed by: INTERNAL MEDICINE

## 2021-07-14 PROCEDURE — G8754 DIAS BP LESS 90: HCPCS | Performed by: INTERNAL MEDICINE

## 2021-07-14 PROCEDURE — G8752 SYS BP LESS 140: HCPCS | Performed by: INTERNAL MEDICINE

## 2021-07-14 RX ORDER — METFORMIN HYDROCHLORIDE 500 MG/1
500 TABLET, EXTENDED RELEASE ORAL 2 TIMES DAILY WITH MEALS
Qty: 180 TABLET | Refills: 3 | Status: SHIPPED | OUTPATIENT
Start: 2021-07-14 | End: 2022-04-14 | Stop reason: SDUPTHER

## 2021-07-14 NOTE — LETTER
August 13, 2021     Nivia Garcia DO  [address]  [address] [address]  [place] [address]    Patient: Yusra Walters  YOB: 1945  Date of Visit: 7/14/2021      Dear Dr. Garcia:    Thank you for referring Yusra Walters to me for evaluation. Below are my notes for this consultation.    If you have questions, please do not hesitate to call me. I look forward to following your patient along with you.         Sincerely,        Sara Suarez MD        CC: No Recipients  Sara Suarez MD  7/15/2021 12:52 AM  Sign when Signing Visit        Sara Suarez MD  8/13/2021  5:14 PM  Signed  Subjective      Patient ID: Yusra Walters is a 76 y.o. female with  HTN, Dm2  h/o Subclinical Hypothyroidism, thyroid nodule, HLD, referred for Endocrine concerns.    Former pt of Dr. Beth    PCP             Nivia Garcia DO  (PCP for nearly 30 years)  [facility]iology:  [facility]    Prior Endocrine [facility]--> Demetrius Millan at [place] since Kirit retired.    HPI   Allergies   Allergen Reactions   • Nsaids (Non-Steroidal Anti-Inflammatory Drug) Other (see comments)     NO NSAIDS OR ASA   Secondary  To previous subarachnoid bleed     Past Surgical History:   Procedure Laterality Date   • BLADDER SUSPENSION     • BREAST CYST EXCISION     • SINUS SURGERY  03/14/2021     SINUS SURGERY: 3 2021: deviated septum repair.  Turbinate surgery for chronic ethmoid, frontal and maxillary sinusitis    Past Medical History:   Diagnosis Date   • Anxiety    • Arthritis    • Disease of thyroid gland    • Elevated lipids    • GERD (gastroesophageal reflux disease)    • Hypertension    • Thyroid nodule 4/14/2021   • Type 2 diabetes mellitus with other specified complication (CMS/[facility]) 4/14/2021     PMHX:    PEREZ: severe, not treated.  Deviated Septum  MDD:  Depression for years on [id number] mg prn  Prozac 20 mg x 2 = 40 mg total x 25 years.  Prior hospitalization for post partum  "depression.    ?ADD: on Ritalin, tested in the past by psychiatrist in [place].    Syncope: prior  Episode in 2015 with loop monitor, no arrhythmia. Sees Dr. Howell    Arthritis:   in R wrist; had surgery L wrist, and had tendon moved.    1 9 2019: Thyroid ultrasound  RIGHT LOBE  3.7 x 0.8 x 1.0 cm.  Normal echogenicity and vascularity without focal mass.     LEFT LOBE  4.1 x 0.7 x 1.4 cm.  Solitary mass.  1.  Medial midpole near the isthmus: 0.5 x 0.3 x 0.4 cm clearly circumscribed  homogeneous hypoechoic solid mass.  No central vascularity.  There may be  peripheral vascularity.  No calcifications.     ISTHMUS  Normal, measuring 0.3 cm.  IMPRESSION:  1.  0.5 cm left thyroid nodule.  2.  Slightly small gland.    Subclinical Hypothyroidism:    Dr Beth weaned her OFF Synthroid as of 2 2020;  Without any change in her chronic fatigue per his notes.  Brain fog a chronic complaint.  Saw Demetrius Millan and he put her on [place] thyroid small dose.  Was on it x 6 months, and went off of it after 3 months.  Brain fog was not any better and he agreed she \"could take it if she wanted and could take it if she needed it.\"  Last took it 3 months ago.    OFF of thyroid hormone x 3 months    Type 2 DM:  DM2 since 2018;  A1c up to 7.2 in 2 13 20 and metformin 500 bid begun  Saw ophtho  3 2021 Dr. Demetrius Foy and [place];  No retinopathy  No numbness in her feet.  Gets some anterior shin numbness only    DATABASE: Dr Beth old data          FHX:      Date HbA1c KFT     Urine Malb/Cr Lipid/LFT TSH   9 21 18 6.5% Cr 0.74 GFR 80  AST 21, ALT 30* TSH 0.54 on 88---->75   1 19 19 6.5  Yunior 3  TSH 1.33  TPO ab neg  TG Ab neg   4 10 19 6.2% Cr 0.78, K 3.9  Jfps283 HDL 56,  LDL 90 on crstor TSH 1.21 on LT4 50  LFT neg  AST 31*  B12 536  Celiac screen Neg  HLA screen Neg  25 D 55   7 12 19     TSH 2.4 on Syn 25   2 13 20 7.2%  Metformin begun K 3.8 Cr 0.72 GFR 87   TSH 2.94 OFF SYNTHROID   2 24 21  Glu 142 bun " 22 Cr 0.83, GFR 69  Na138  Glu 142 K 4.2 Co2 25  Chol 124, HDL TG 87 LDL 58 on Crestor 5+ Zetia 10 mg  LFT normal, ALT 49**  25 D 86    6 29 20 6.0% on metformin bid    TSH 1.21  TG ab  TPO <1  (on [place] x 6 weeks)   8 19 20     TSH 3.04   2 24 21  K 4.2 alb 4.4   Glu 136*  AST normal  ALT 52* (>29)  WBC 8.4 H/H 12.4/37.8 mcv 99   nnormal diff   3 4 21 A1c 6.5*    TSH 2.56 OFF [place]   7 9 21 A1c 6.3 Urine MAlbCr NORMAL.  Bun 22 Cr 0.9  K 3.6 co2 27   TSH 3.1, on no RX     Lab Results   Component Value Date    HGBA1C 6.3 (H) 2021       Lab Results   Component Value Date     2015    K 3.6 2015    CO2 27 2015     2015    BUN 22 (H) 2015    CREATININE 0.9 2015       Lab Results   Component Value Date    TSH 3.10 2021       FHX:  Mother and father with DM2  Mother with MI in mid 80s, and valve replacement  Mother had thyroid disease  Mother breast cancer  Mo and Fa with HLD  Father with CAD  Mother  93 with heart  Children healthy    SHX:  Sporadic w exercise.  2 grown children.   at home. Prior [employer].  Likes to put on music and dance. Liked [place]  Former smoker, smoked about 15 years; 1ppd x quit in     ROS at initial OV: in italics  Transient diarrhea on metformin, but none now  No constipation or abd pain  Had normal colonoscopy in the past in   Had normal DEXA in the past for PCP.  Taking 1000 mg of Calcium     No Sob or CP.  No LIEBERMAN  covid vaccin, Moderna x 2    Chronic Fatigue x many years.  Bad sleeper.  Sleep is disrupted. May get 7 hours total, but cant sleep till 2-3 am.  Has DFA.    Severe PEREZ 10 2019 AHI 43   Not treating her PEREZ at this time;    Just had sinus surgery for Deviated septum and multiple infections at [place] in 3 2021, Kevin Ocampo.  Had Pan Sinusitis.    No choking, hoarseness or trouble swallowing.  No XRT or FHX of thryoid cancer.    Chronic insomnia    Has cold hands and feet  Wt is very  stable    Had recurrent sinus infections in the prior to the last A1c 6.5 and steroids.  Now that had sinus surgery should be better.  Had had 3 sinus infections in the last year.    ROS TODAY 7 14 21;    Is in an online facebook diabetes group. The support really helps her.    Has some baseline fecal incontinence and has to wear a pad all the time due to bladder leakage issues but has to wear a pad due to fecal urgency w some incontinence.  Sometimes she does not know that it happened.  It happened yesterday and had had COFFEE which moves her faster.    Metformin is 500 mg po BID.    Discussed switching to METFORMIN  ER.  Has hemorrhoids which are bleeding also if she strains or gets a little bit constipated.    Struggling w depression and anxiety and her ADD.  NOT TREATING HER SEVERE PEREZ which causes her fatigue and brain fog.  For years was treated with LT4 and [place] which made NO differenct in her brain fog complaint.    Having a CT Scan to fu on a lung nodule.    Had a rough time getting here and parking was a problem. Dented her car also. Anxious! About it all.    Takes the Buspar but not daily as it sometimes makes her tired.  Interested in speaking with psychiatry.    Constitutional: Negative for unexpected weight change.   HENT: Negative for trouble swallowing.    Eyes: Negative for visual disturbance.   Respiratory: Negative for chest tightness.    Cardiovascular: Negative for chest pain and palpitations.   Gastrointestinal: Negative for diarrhea and nausea.   Endocrine: Negative for cold intolerance and heat intolerance.   Musculoskeletal: Negative for myalgias.   Skin: Negative for rash.   Neurological: Negative for tremors.   Psychiatric/Behavioral: Negative.       Weight: 71.5 kg (157 lb 9.6 oz)       Wt Readings from Last 10 Encounters:   04/14/21 70.4 kg (155 lb 3.2 oz)   11/18/19 70.3 kg (155 lb)   10/30/19 70.3 kg (155 lb)   09/18/18 69.9 kg (154 lb)   06/20/18 70.8 kg (156 lb)     Family History  "  Problem Relation Age of Onset   • Breast cancer Biological Mother    • Colon cancer Biological Brother    • Diabetes Neg Hx          Current Outpatient Medications:   •  amLODIPine (NORVASC) 10 mg tablet, Take 10 mg by mouth daily., Disp: , Rfl:   •  blood sugar diagnostic strip, To tests 4x daily, Disp: 200 strip, Rfl: 11  •  blood-glucose meter (FREESTYLE LITE METER) kit, Check blood sugars 4X a day, Disp: 1 each, Rfl: 0  •  busPIRone (BUSPAR) 10 mg tablet, Take 10 mg by mouth 2 times daily., Disp: , Rfl:   •  busPIRone (BUSPAR) 5 mg tablet, Take 10 mg by mouth as needed. , Disp: , Rfl:   •  calcium carbonate-vitamin D3 500 mg(1,250mg) -200 unit per tablet, Take 1 tablet by mouth daily., Disp: , Rfl:   •  cholecalciferol, vitamin D3, 1,000 unit tablet, Take 2,000 Units by mouth daily., Disp: , Rfl:   •  ezetimibe (ZETIA) 10 mg tablet, , Disp: , Rfl:   •  FLUoxetine (PROzac) 20 mg capsule, Take 20 mg by mouth 2 (two) times a day. , Disp: , Rfl:   •  FREESTYLE LITE STRIPS strip, Check blood sugars 4 X a day, Disp: 400 strip, Rfl: 6  •  lancets (BD ULTRA FINE LANCETS) 33 gauge misc, Check blood sugars 4x a day, Disp: 400 each, Rfl: 6  •  lisinopril (PRINIVIL) 5 mg tablet, Take 5 mg by mouth daily., Disp: , Rfl:   •  metFORMIN (GLUCOPHAGE) 500 mg tablet, Take 1 tablet (500 mg total) by mouth 2 (two) times a day with meals., Disp: 180 tablet, Rfl: 3  •  methylphenidate HCl (RITALIN) 20 mg tablet, Take 20 mg by mouth 2 (two) times a day., Disp: , Rfl:   •  omeprazole (PriLOSEC) 20 mg capsule, Take 20 mg by mouth daily before breakfast., Disp: , Rfl:   •  rosuvastatin (CRESTOR) 5 mg tablet, Take 10 mg by mouth daily. , Disp: , Rfl:   •  vitamin E 200 unit capsule, Take 400 Units by mouth daily., Disp: , Rfl:       Objective    Physical Exam     Vitals:    07/14/21 1044   BP: 114/76   Resp: 18   SpO2: 98%       General:     WDWN, in NAD; appearing stated age. Very pleasant. Mildly anxious. HR 96 but just had a \"fender " "dinh\"    HEENT:      Facies symmetric; Anicteric, without gaze preference                      Widened R palpebral fissure                      No thyroid or carotid bruit; no thyromegaly or nodule palapble.                      No increased JVP.                      No cervical lymphadenopathy.  COR:           Normal S1, S2; no murmurs gallops or rubs  LUNGS:       Normal effort; Lungs clear   ABDOMEN: NABS: Nontender, nondistended; no HSM or masses.   EXT:            No cyanosis, clubbing or edema or tremor.                      No pedal deformities, ulcerations or calluses.                      Wearing appropriate footwear.  SKIN:          No rash.  PSYCH:      Normal insight and judgment. Alert and O x 3.                          Assessment/Plan     Type 2 diabetes mellitus with other specified complication, unspecified whether long term insulin use (CMS/[facility])    Last A1c 6.2 in 4 2019 --->7.2 in 2 13 20---> 6.4% 3/2021---->6.3% as of 7 9 21;   Doing very well on Metformin monotherapy 500 mg po bid.  Has some fecal incontinence that is baseline and troubling and would change to Metformin  BID and see if that helps her. If not there are numerous other options.    Eye exam 6 mo ago: Dr. Lange, \"R eye changed\" a lot told her to watch out for flashers.  Told to RTC 1 year. NO DR at all.    Has had subcritical stenosis of carotids and should fu to cardiology and got better. FU to PCP on this.  No TIA Sx.    Does not sleep well. Ruminates. Worrier.  Also has UNCONTROLLED PEREZ which was severe and has not treated.  Sleep disturbance may  re: raise BG;  Needs to have regular exercise routine and  Improve sleep hygiene    Has JOSE RAMON and interstitial cystitis. would avoid SGLT    PEREZ/Allergies;  MUST fu to Dr Silva  Untreated  PEREZ may drive up sugars, Bipap and CPAP in past  And really needs to treat this, discussed.  AHI of 43 in 2019   ? Nasal pillows may help her  CPAP did not work in the past. Has fu to  " "[facility].    Thyroid nodule:  Sono at [address] 11 2017: with 5 x 3 x 4 mm nodule at L aspect of isthmus.  1/ 2019 ultrasound at [place] with 5 mm nodule documented on Left.  No fna indicated  Recheck at some point; not clinically significant    \"Subclinical Hypothyroidism\":  For fatigue that was of many years duration had been on 88 mcg which Dr. Beth tapered   OFF of LT4 entirely w normal TSH in the range of 2.5-3.0 OFF of meds    Taking her TSH down to 1.5 with LT4 made NO difference in her fatigue and Brain fog.  Told her that I do not think she needs any thyroid hormone.    Feels no better on thyroid hormone than OFF of it.  Suspect that her Fatigue is due to untreated PEREZ.  I do not believe she needs LT4.      Pulmonary Nodule/PEREZ;  To have a fu CAT scan in June 2021 with fu to Dr. Silva in 5 2021    HTN:  Norvasc 10 mg and Lisinopril 5 mg.  Well controlled.    BP Readings from Last 3 Encounters:   07/14/21 114/76   04/14/21 116/68   09/18/18 110/78     Anxiety/ADD:  A real problem for her. Interested in seeing a psychiatrist for help w this.   Gave her  name. Would benefit from therapy also likely.      INSTRUCTIONS:    Discussed with the patient that I will be leaving [facility] as of 10/1/2021  after which point they will be assigned a new physician in the practice to help with their endocrine concerns.    1. You need to get some movement in your day please.  If [employer] does not work for you.... then set an alarm to go every day walking.  That helps you form a healthy habit.     2. You have some DASH PHENOMENON.  This may well be due to the sleep apnea not being tuned up....  If you dont sleep well in the night and if your brain and heart dont get   enough oxygen which raises your cortisol and your BP and your blood sugar   in the morning.     3. Psychiatrist: Dr. Akbar Salas if you are interested in switching your ADD medication.  He can recommend a " female therapist to talk to to deal with the depression.  Morning exercise may help you sleep better. If you jsut try it.     4. If you have vaginal itching you should see your GYN.     5. I do NOT think that you need THYROID HORMONE.  I think it will worsen your anxiety.     6. If your cardiologist did BW, plz have them send it to us to not duplicate.     7. See the PODIATIST when you can.     8. You are doing very well with the diabetes.     I would see Dr. REYNALDO KINCAID   She took over Dr. Patricio practice.                     Electronically signed by Sara Suarez MD at 7/14/2021 11:21 AM        This note was created with voice recognition software.   Inadvertent dictation errors should be disregarded.   Please contact my office if clarification needed at .

## 2021-07-14 NOTE — PATIENT INSTRUCTIONS
1. You need to get some movement in your day please.  If [employer] does not work for you.... or set an alarm to go every day walking.  That helps you form a healthy habit.    2. You have some DASH PHENOMENON.  This may well be due to the sleep apnea not being tuned up....  If you dont sleep well in the night and if your brain and heart dont get enough oxygen which raises your cortisol and your BP and your blood sugar in the morning.    3. Psychiatrist: Dr. Akbar Salas if you are interested in switching your ADD medication.  He can recommend a female therapist to talk to to deal with the depression.  Morning exercise may help you sleep better. If you jsut try it.    4. If you have vaginal itching you should see your GYN.    5. I do NOT think that you need THYROID HORMONE.  I think it will worsen your anxiety.    6. If your cardiologist did BW, plz have them send it to us to not duplicate.    7. See the PODIATIST when you can.    8. You are doing very well with the diabetes.    I would see Dr. REYNALDO KINCAID   She took over Dr. Sintia chavez.

## 2021-07-14 NOTE — PROGRESS NOTES
Subjective      Patient ID: Yusra Walters is a 76 y.o. female with  HTN, Dm2  h/o Subclinical Hypothyroidism, thyroid nodule, HLD, referred for Endocrine concerns.    Former pt of Dr. Beth    PCP             Nivia Garcia DO  (PCP for nearly 30 years)  Cardiology:  Gautam Howell Fowler    Prior Endocrine Kirit--> eDmetrius Millan at [facility] since Kirit retired.    HPI   Allergies   Allergen Reactions   • Nsaids (Non-Steroidal Anti-Inflammatory Drug) Other (see comments)     NO NSAIDS OR ASA   Secondary  To previous subarachnoid bleed     Past Surgical History:   Procedure Laterality Date   • BLADDER SUSPENSION     • BREAST CYST EXCISION     • SINUS SURGERY  03/14/2021     SINUS SURGERY: 3 2021: deviated septum repair.  Turbinate surgery for chronic ethmoid, frontal and maxillary sinusitis    Past Medical History:   Diagnosis Date   • Anxiety    • Arthritis    • Disease of thyroid gland    • Elevated lipids    • GERD (gastroesophageal reflux disease)    • Hypertension    • Thyroid nodule 4/14/2021   • Type 2 diabetes mellitus with other specified complication (CMS/HCC) 4/14/2021     PMHX:    PEREZ: severe, not treated.  Deviated Septum  MDD:  Depression for years on [id number] mg prn  Prozac 20 mg x 2 = 40 mg total x 25 years.  Prior hospitalization for post partum depression.    ?ADD: on Ritalin, tested in the past by psychiatrist in [place].    Syncope: prior  Episode in 2015 with loop monitor, no arrhythmia. Sees Dr. Howell    Arthritis:   in R wrist; had surgery L wrist, and had tendon moved.    1 9 2019: Thyroid ultrasound  RIGHT LOBE  3.7 x 0.8 x 1.0 cm.  Normal echogenicity and vascularity without focal mass.     LEFT LOBE  4.1 x 0.7 x 1.4 cm.  Solitary mass.  1.  Medial midpole near the isthmus: 0.5 x 0.3 x 0.4 cm clearly circumscribed  homogeneous hypoechoic solid mass.  No central vascularity.  There may be  peripheral vascularity.  No calcifications.     ISTHMUS  Normal, measuring  "0.3 cm.  IMPRESSION:  1.  0.5 cm left thyroid nodule.  2.  Slightly small gland.    Subclinical Hypothyroidism:    Dr Beth weaned her OFF Synthroid as of 2 2020;  Without any change in her chronic fatigue per his notes.  Brain fog a chronic complaint.  Saw Demetrius Millan and he put her on Hobbs thyroid small dose.  Was on it x 6 months, and went off of it after 3 months.  Brain fog was not any better and he agreed she \"could take it if she wanted and could take it if she needed it.\"  Last took it 3 months ago.    OFF of thyroid hormone x 3 months    Type 2 DM:  DM2 since 2018;  A1c up to 7.2 in 2 13 20 and metformin 500 bid begun  Saw ophtho  3 2021 Dr. Demetrius Foy and [place];  No retinopathy  No numbness in her feet.  Gets some anterior shin numbness only    DATABASE: Dr Beth old data          FHX:      Date HbA1c KFT     Urine Malb/Cr Lipid/LFT TSH   9 21 18 6.5% Cr 0.74 GFR 80  AST 21, ALT 30* TSH 0.54 on 88---->75   1 19 19 6.5  Yunior 3  TSH 1.33  TPO ab neg  TG Ab neg   4 10 19 6.2% Cr 0.78, K 3.9  [id number] HDL 56,  LDL 90 on crstor TSH 1.21 on LT4 50  LFT neg  AST 31*  B12 536  Celiac screen Neg  HLA screen Neg  25 D 55   7 12 19     TSH 2.4 on Syn 25   2 13 20 7.2%  Metformin begun K 3.8 Cr 0.72 GFR 87   TSH 2.94 OFF SYNTHROID   2 24 21  Glu 142 bun 22 Cr 0.83, GFR 69  Na138  Glu 142 K 4.2 Co2 25  Chol 124, HDL TG 87 LDL 58 on Crestor 5+ Zetia 10 mg  LFT normal, ALT 49**  25 D 86    6 29 20 6.0% on metformin bid    TSH 1.21  TG ab  TPO <1  (on Hobbs x 6 weeks)   8 19 20     TSH 3.04   2 24 21  K 4.2 alb 4.4   Glu 136*  AST normal  ALT 52* (>29)  WBC 8.4 H/H 12.4/37.8 mcv 99   nnormal diff   3 4 21 A1c 6.5*    TSH 2.56 OFF Hobbs   7 9 21 A1c 6.3 Urine MAlbCr NORMAL.  Bun 22 Cr 0.9  K 3.6 co2 27   TSH 3.1, on no RX     Lab Results   Component Value Date    HGBA1C 6.3 (H) 07/09/2021       Lab Results   Component Value Date     05/02/2015    K 3.6 05/02/2015    CO2 27 " 2015     2015    BUN 22 (H) 2015    CREATININE 0.9 2015       Lab Results   Component Value Date    TSH 3.10 2021       FHX:  Mother and father with DM2  Mother with MI in mid 80s, and valve replacement  Mother had thyroid disease  Mother breast cancer  Mo and Fa with HLD  Father with CAD  Mother  93 with heart  Children healthy    SHX:  Sporadic w exercise.  2 grown children.   at home. Prior [employer].  Likes to put on music and dance. Liked [email or web address]  Former smoker, smoked about 15 years; 1ppd x quit in     ROS at initial OV: in italics  Transient diarrhea on metformin, but none now  No constipation or abd pain  Had normal colonoscopy in the past in   Had normal DEXA in the past for PCP.  Taking 1000 mg of Calcium     No Sob or CP.  No LIEBERMAN  covid vaccin, Moderna x 2    Chronic Fatigue x many years.  Bad sleeper.  Sleep is disrupted. May get 7 hours total, but cant sleep till 2-3 am.  Has DFA.    Severe PEREZ 10 2019 AHI 43   Not treating her PEREZ at this time;    Just had sinus surgery for Deviated septum and multiple infections at [place] in 3 2021, Kevin Ocampo.  Had Pan Sinusitis.    No choking, hoarseness or trouble swallowing.  No XRT or FHX of thryoid cancer.    Chronic insomnia    Has cold hands and feet  Wt is very stable    Had recurrent sinus infections in the prior to the last A1c 6.5 and steroids.  Now that had sinus surgery should be better.  Had had 3 sinus infections in the last year.    ROS TODAY 7  21;    Is in an online facebook diabetes group. The support really helps her.    Has some baseline fecal incontinence and has to wear a pad all the time due to bladder leakage issues but has to wear a pad due to fecal urgency w some incontinence.  Sometimes she does not know that it happened.  It happened yesterday and had had COFFEE which moves her faster.    Metformin is 500 mg po BID.    Discussed switching to METFORMIN  ER.  Has hemorrhoids  which are bleeding also if she strains or gets a little bit constipated.    Struggling w depression and anxiety and her ADD.  NOT TREATING HER SEVERE PEREZ which causes her fatigue and brain fog.  For years was treated with LT4 and [place] which made NO differenct in her brain fog complaint.    Having a CT Scan to fu on a lung nodule.    Had a rough time getting here and parking was a problem. Dented her car also. Anxious! About it all.    Takes the Buspar but not daily as it sometimes makes her tired.  Interested in speaking with psychiatry.    Constitutional: Negative for unexpected weight change.   HENT: Negative for trouble swallowing.    Eyes: Negative for visual disturbance.   Respiratory: Negative for chest tightness.    Cardiovascular: Negative for chest pain and palpitations.   Gastrointestinal: Negative for diarrhea and nausea.   Endocrine: Negative for cold intolerance and heat intolerance.   Musculoskeletal: Negative for myalgias.   Skin: Negative for rash.   Neurological: Negative for tremors.   Psychiatric/Behavioral: Negative.       Weight: 71.5 kg (157 lb 9.6 oz)       Wt Readings from Last 10 Encounters:   04/14/21 70.4 kg (155 lb 3.2 oz)   11/18/19 70.3 kg (155 lb)   10/30/19 70.3 kg (155 lb)   09/18/18 69.9 kg (154 lb)   06/20/18 70.8 kg (156 lb)     Family History   Problem Relation Age of Onset   • Breast cancer Biological Mother    • Colon cancer Biological Brother    • Diabetes Neg Hx          Current Outpatient Medications:   •  amLODIPine (NORVASC) 10 mg tablet, Take 10 mg by mouth daily., Disp: , Rfl:   •  blood sugar diagnostic strip, To tests 4x daily, Disp: 200 strip, Rfl: 11  •  blood-glucose meter (FREESTYLE LITE METER) kit, Check blood sugars 4X a day, Disp: 1 each, Rfl: 0  •  busPIRone (BUSPAR) 10 mg tablet, Take 10 mg by mouth 2 times daily., Disp: , Rfl:   •  busPIRone (BUSPAR) 5 mg tablet, Take 10 mg by mouth as needed. , Disp: , Rfl:   •  calcium carbonate-vitamin D3 500 mg(1,250mg)  "-200 unit per tablet, Take 1 tablet by mouth daily., Disp: , Rfl:   •  cholecalciferol, vitamin D3, 1,000 unit tablet, Take 2,000 Units by mouth daily., Disp: , Rfl:   •  ezetimibe (ZETIA) 10 mg tablet, , Disp: , Rfl:   •  FLUoxetine (PROzac) 20 mg capsule, Take 20 mg by mouth 2 (two) times a day. , Disp: , Rfl:   •  FREESTYLE LITE STRIPS strip, Check blood sugars 4 X a day, Disp: 400 strip, Rfl: 6  •  lancets (BD ULTRA FINE LANCETS) 33 gauge misc, Check blood sugars 4x a day, Disp: 400 each, Rfl: 6  •  lisinopril (PRINIVIL) 5 mg tablet, Take 5 mg by mouth daily., Disp: , Rfl:   •  metFORMIN (GLUCOPHAGE) 500 mg tablet, Take 1 tablet (500 mg total) by mouth 2 (two) times a day with meals., Disp: 180 tablet, Rfl: 3  •  methylphenidate HCl (RITALIN) 20 mg tablet, Take 20 mg by mouth 2 (two) times a day., Disp: , Rfl:   •  omeprazole (PriLOSEC) 20 mg capsule, Take 20 mg by mouth daily before breakfast., Disp: , Rfl:   •  rosuvastatin (CRESTOR) 5 mg tablet, Take 10 mg by mouth daily. , Disp: , Rfl:   •  vitamin E 200 unit capsule, Take 400 Units by mouth daily., Disp: , Rfl:       Objective    Physical Exam     Vitals:    07/14/21 1044   BP: 114/76   Resp: 18   SpO2: 98%       General:     WDWN, in NAD; appearing stated age. Very pleasant. Mildly anxious. HR 96 but just had a \"fender dinh\"    HEENT:      Facies symmetric; Anicteric, without gaze preference                      Widened R palpebral fissure                      No thyroid or carotid bruit; no thyromegaly or nodule palapble.                      No increased JVP.                      No cervical lymphadenopathy.  COR:           Normal S1, S2; no murmurs gallops or rubs  LUNGS:       Normal effort; Lungs clear   ABDOMEN: NABS: Nontender, nondistended; no HSM or masses.   EXT:            No cyanosis, clubbing or edema or tremor.                      No pedal deformities, ulcerations or calluses.                      Wearing appropriate footwear.  SKIN:       " "   No rash.  PSYCH:      Normal insight and judgment. Alert and O x 3.                          Assessment/Plan     Type 2 diabetes mellitus with other specified complication, unspecified whether long term insulin use (CMS/[facility])    Last A1c 6.2 in 4 2019 --->7.2 in 2 13 20---> 6.4% 3/2021---->6.3% as of 7 9 21;   Doing very well on Metformin monotherapy 500 mg po bid.  Has some fecal incontinence that is baseline and troubling and would change to Metformin  BID and see if that helps her. If not there are numerous other options.    Eye exam 6 mo ago: Dr. Lange, \"R eye changed\" a lot told her to watch out for flashers.  Told to RTC 1 year. NO DR at all.    Has had subcritical stenosis of carotids and should fu to cardiology and got better. FU to PCP on this.  No TIA Sx.    Does not sleep well. Ruminates. Worrier.  Also has UNCONTROLLED PEREZ which was severe and has not treated.  Sleep disturbance may  re: raise BG;  Needs to have regular exercise routine and  Improve sleep hygiene    Has JOSE RAMON and interstitial cystitis. would avoid SGLT    PEREZ/Allergies;  MUST fu to Dr Silva  Untreated  PEREZ may drive up sugars, Bipap and CPAP in past  And really needs to treat this, discussed.  AHI of 43 in 2019   ? Nasal pillows may help her  CPAP did not work in the past. Has fu to Dr. Silva of Pulnmonary.    Thyroid nodule:  [facility]ono at [place] 11 2017: with 5 x 3 x 4 mm nodule at L aspect of isthmus.  1/ 2019 ultrasound at [place] with 5 mm nodule documented on Left.  No fna indicated  Recheck at some point; not clinically significant    \"Subclinical Hypothyroidism\":  For fatigue that was of many years duration had been on 88 mcg which Dr. Beth tapered   OFF of LT4 entirely w normal TSH in the range of 2.5-3.0 OFF of meds    Taking her TSH down to 1.5 with LT4 made NO difference in her fatigue and Brain fog.  Told her that I do not think she needs any thyroid hormone.    Feels no better on thyroid hormone than OFF of " it.  Suspect that her Fatigue is due to untreated PEREZ.  I do not believe she needs LT4.      Pulmonary Nodule/PEREZ;  To have a fu CAT scan in June 2021 with fu to Dr. Silva in 5 2021    HTN:  Norvasc 10 mg and Lisinopril 5 mg.  Well controlled.    BP Readings from Last 3 Encounters:   07/14/21 114/76   04/14/21 116/68   09/18/18 110/78     Anxiety/ADD:  A real problem for her. Interested in seeing a psychiatrist for help w this.   Gave her  name. Would benefit from therapy also likely.      INSTRUCTIONS:    Discussed with the patient that I will be leaving [facility] as of 10/1/2021  after which point they will be assigned a new physician in the practice to help with their endocrine concerns.    1. You need to get some movement in your day please.  If [employer] does not work for you.... then set an alarm to go every day walking.  That helps you form a healthy habit.     2. You have some DASH PHENOMENON.  This may well be due to the sleep apnea not being tuned up....  If you dont sleep well in the night and if your brain and heart dont get   enough oxygen which raises your cortisol and your BP and your blood sugar   in the morning.     3. Psychiatrist: Dr. Akbar Salas if you are interested in switching your ADD medication.  He can recommend a female therapist to talk to to deal with the depression.  Morning exercise may help you sleep better. If you jsut try it.     4. If you have vaginal itching you should see your GYN.     5. I do NOT think that you need THYROID HORMONE.  I think it will worsen your anxiety.     6. If your cardiologist did BW, plz have them send it to us to not duplicate.     7. See the PODIATIST when you can.     8. You are doing very well with the diabetes.     I would see Dr. REYNALDO KINCAID   She took over Dr. Sintia chavez.                     Electronically signed by Sara Suarez MD at 7/14/2021 11:21 AM        This note was created with  voice recognition software.   Inadvertent dictation errors should be disregarded.   Please contact my office if clarification needed at .

## 2021-09-10 ENCOUNTER — TELEPHONE (OUTPATIENT)
Dept: ENDOCRINOLOGY | Facility: CLINIC | Age: 76
End: 2021-09-10

## 2021-10-14 ENCOUNTER — APPOINTMENT (OUTPATIENT)
Dept: URBAN - METROPOLITAN AREA CLINIC 200 | Age: 76
Setting detail: DERMATOLOGY
End: 2021-10-15

## 2021-10-14 DIAGNOSIS — Z11.52 ENCOUNTER FOR SCREENING FOR COVID-19: ICD-10-CM

## 2021-10-14 DIAGNOSIS — L57.8 OTHER SKIN CHANGES DUE TO CHRONIC EXPOSURE TO NONIONIZING RADIATION: ICD-10-CM

## 2021-10-14 PROBLEM — E03.9 HYPOTHYROIDISM, UNSPECIFIED: Status: ACTIVE | Noted: 2021-10-14

## 2021-10-14 PROCEDURE — OTHER COUNSELING: OTHER

## 2021-10-14 PROCEDURE — OTHER MIPS QUALITY: OTHER

## 2021-10-14 PROCEDURE — 99213 OFFICE O/P EST LOW 20 MIN: CPT

## 2021-10-14 PROCEDURE — OTHER SCREENING FOR COVID-19: OTHER

## 2021-10-14 PROCEDURE — OTHER SUNSCREEN RECOMMENDATIONS: OTHER

## 2021-10-14 ASSESSMENT — LOCATION ZONE DERM: LOCATION ZONE: TRUNK

## 2021-10-14 ASSESSMENT — LOCATION DETAILED DESCRIPTION DERM: LOCATION DETAILED: PERIUMBILICAL SKIN

## 2021-10-14 ASSESSMENT — LOCATION SIMPLE DESCRIPTION DERM: LOCATION SIMPLE: ABDOMEN

## 2022-02-09 DIAGNOSIS — E78.5 HYPERLIPIDEMIA, UNSPECIFIED HYPERLIPIDEMIA TYPE: ICD-10-CM

## 2022-02-09 DIAGNOSIS — E04.1 THYROID NODULE: ICD-10-CM

## 2022-02-09 DIAGNOSIS — E11.69 TYPE 2 DIABETES MELLITUS WITH OTHER SPECIFIED COMPLICATION, UNSPECIFIED WHETHER LONG TERM INSULIN USE (CMS/HCC): Primary | ICD-10-CM

## 2022-03-23 ENCOUNTER — TELEPHONE (OUTPATIENT)
Dept: ENDOCRINOLOGY | Facility: CLINIC | Age: 77
End: 2022-03-23
Payer: MEDICARE

## 2022-04-09 LAB
ALBUMIN SERPL-MCNC: 4.3 G/DL (ref 3.6–5.1)
ALBUMIN/GLOB SERPL: 1.8 (CALC) (ref 1–2.5)
ALP SERPL-CCNC: 72 U/L (ref 37–153)
ALT SERPL-CCNC: 26 U/L (ref 6–29)
AST SERPL-CCNC: 21 U/L (ref 10–35)
BILIRUB SERPL-MCNC: 0.6 MG/DL (ref 0.2–1.2)
BUN SERPL-MCNC: 24 MG/DL (ref 7–25)
BUN/CREAT SERPL: 43 (CALC) (ref 6–22)
CALCIUM SERPL-MCNC: 9.2 MG/DL (ref 8.6–10.4)
CHLORIDE SERPL-SCNC: 103 MMOL/L (ref 98–110)
CO2 SERPL-SCNC: 30 MMOL/L (ref 20–32)
CREAT SERPL-MCNC: 0.56 MG/DL (ref 0.6–0.93)
GLOBULIN SER CALC-MCNC: 2.4 G/DL (CALC) (ref 1.9–3.7)
GLUCOSE SERPL-MCNC: 134 MG/DL (ref 65–99)
HBA1C MFR BLD: 6.1 % OF TOTAL HGB
POTASSIUM SERPL-SCNC: 3.9 MMOL/L (ref 3.5–5.3)
PROT SERPL-MCNC: 6.7 G/DL (ref 6.1–8.1)
QUEST EGFR AFRICAN AMERICAN: 105 ML/MIN/1.73M2
QUEST EGFR NON-AFR. AMERICAN: 91 ML/MIN/1.73M2
SODIUM SERPL-SCNC: 140 MMOL/L (ref 135–146)
T4 FREE SERPL-MCNC: 0.9 NG/DL (ref 0.8–1.8)
TSH SERPL-ACNC: 2.82 MIU/L (ref 0.4–4.5)

## 2022-04-11 ENCOUNTER — TELEPHONE (OUTPATIENT)
Dept: ENDOCRINOLOGY | Facility: CLINIC | Age: 77
End: 2022-04-11
Payer: MEDICARE

## 2022-04-14 ENCOUNTER — OFFICE VISIT (OUTPATIENT)
Dept: ENDOCRINOLOGY | Facility: CLINIC | Age: 77
End: 2022-04-14
Payer: MEDICARE

## 2022-04-14 VITALS
OXYGEN SATURATION: 98 % | BODY MASS INDEX: 24.99 KG/M2 | HEIGHT: 65 IN | TEMPERATURE: 97.2 F | WEIGHT: 150 LBS | HEART RATE: 84 BPM | RESPIRATION RATE: 16 BRPM

## 2022-04-14 DIAGNOSIS — E78.49 OTHER HYPERLIPIDEMIA: ICD-10-CM

## 2022-04-14 DIAGNOSIS — E11.69 TYPE 2 DIABETES MELLITUS WITH OTHER SPECIFIED COMPLICATION, UNSPECIFIED WHETHER LONG TERM INSULIN USE (CMS/HCC): ICD-10-CM

## 2022-04-14 DIAGNOSIS — E04.1 THYROID NODULE: ICD-10-CM

## 2022-04-14 DIAGNOSIS — E03.8 SUBCLINICAL HYPOTHYROIDISM: ICD-10-CM

## 2022-04-14 DIAGNOSIS — I10 PRIMARY HYPERTENSION: ICD-10-CM

## 2022-04-14 DIAGNOSIS — E11.69 TYPE 2 DIABETES MELLITUS WITH OTHER SPECIFIED COMPLICATION, WITHOUT LONG-TERM CURRENT USE OF INSULIN: Primary | ICD-10-CM

## 2022-04-14 LAB
EXPIRATION DATE: NORMAL
GLUCOSE BLOOD, POC: 153
Lab: NORMAL
POCT MANUFACTURER: NORMAL

## 2022-04-14 PROCEDURE — 99214 OFFICE O/P EST MOD 30 MIN: CPT | Performed by: NURSE PRACTITIONER

## 2022-04-14 PROCEDURE — 82962 GLUCOSE BLOOD TEST: CPT | Performed by: NURSE PRACTITIONER

## 2022-04-14 PROCEDURE — G8756 NO BP MEASURE DOC: HCPCS | Performed by: NURSE PRACTITIONER

## 2022-04-14 RX ORDER — LORAZEPAM 0.5 MG/1
0.5 TABLET ORAL DAILY PRN
COMMUNITY
Start: 2022-03-25

## 2022-04-14 RX ORDER — METFORMIN HYDROCHLORIDE 500 MG/1
500 TABLET, EXTENDED RELEASE ORAL 2 TIMES DAILY WITH MEALS
Qty: 180 TABLET | Refills: 3 | Status: SHIPPED | OUTPATIENT
Start: 2022-04-14 | End: 2022-07-14 | Stop reason: SDUPTHER

## 2022-04-14 RX ORDER — FLUTICASONE PROPIONATE 50 MCG
SPRAY, SUSPENSION (ML) NASAL DAILY PRN
COMMUNITY
Start: 2022-03-02

## 2022-04-14 RX ORDER — TEMAZEPAM 15 MG/1
15 CAPSULE ORAL NIGHTLY PRN
COMMUNITY
Start: 2022-04-13

## 2022-04-14 ASSESSMENT — ENCOUNTER SYMPTOMS
GASTROINTESTINAL NEGATIVE: 1
HEMATOLOGIC/LYMPHATIC NEGATIVE: 1
NEUROLOGICAL NEGATIVE: 1
NERVOUS/ANXIOUS: 1
RESPIRATORY NEGATIVE: 1
ENDOCRINE NEGATIVE: 1
CARDIOVASCULAR NEGATIVE: 1
FATIGUE: 1
BACK PAIN: 1

## 2022-04-14 NOTE — PROGRESS NOTES
Yusra Walters is a 76 y.o. female with a past medical history of type 2 diabetes, subclinical hypothyroidism, thyroid nodule, hypertension, hyperlipidemia, ADD, and anxiety who is here today for routine follow up visit for diabetes and thyroid management.    Last office visit:  7/14/2021 with Dr. Suarez.    Events since last visit:  Diagnosed with lung cancer in 8/2021  Status post right upper lobe lobectomy-U. Dieter  Currently in remission    Home diabetes regimen:  Metformin  mg p.o. twice a day    Tolerating this with no GI side effects.     Failed medications:  Immediate release Metformin-GI side effects    SMBG:  Not checking consistently.  Fasting levels between 130-150  Denies hypoglycemia    Diet:  Balanced most the time    Up-to-date with ophthalmology visit.  Next office visit in April 2022.  Reports blurred vision.  No reported retinopathy.    She does not see podiatrist.  She self checks feet.  No wounds.  Some remittent numbness in toes.  No pain.    History of subclinical hypothyroidism:  Weaned off levothyroxine 2/2020.  Reports chronic fatigue and decreased libido.  TSH consistently in range.  Per Dr. Suarez's last office note, fatigue thought to be attributed to uncontrolled PEREZ (patient is unable to tolerate CPAP).    History of thyroid nodule:  Ultrasound of thyroid obtained on 1/9/2018 revealed 0.5 cm left thyroid nodule.  No FNA indicated.       Vitals:    04/14/22 1026   Pulse: 84   Resp: 16   Temp: 36.2 °C (97.2 °F)   SpO2: 98%        Current Outpatient Medications   Medication Sig Dispense Refill   • amLODIPine (NORVASC) 10 mg tablet Take 10 mg by mouth daily.     • blood sugar diagnostic strip To tests 4x daily 200 strip 11   • blood-glucose meter (FREESTYLE LITE METER) kit Check blood sugars 4X a day 1 each 0   • busPIRone (BUSPAR) 10 mg tablet Take 10 mg by mouth 2 times daily. As needed      • busPIRone (BUSPAR) 5 mg tablet Take 10 mg by mouth as needed.      • calcium  carbonate-vitamin D3 500 mg(1,250mg) -200 unit per tablet Take 1 tablet by mouth daily.     • cholecalciferol, vitamin D3, 1,000 unit tablet Take 2,000 Units by mouth daily.     • ezetimibe (ZETIA) 10 mg tablet 10 mg nightly.     • FLUoxetine (PROzac) 20 mg capsule Take 20 mg by mouth 2 (two) times a day.      • FREESTYLE LITE STRIPS strip Check blood sugars 4 X a day 400 strip 6   • lancets (BD ULTRA FINE LANCETS) 33 gauge misc Check blood sugars 4x a day 400 each 6   • lisinopril (PRINIVIL) 5 mg tablet Take 5 mg by mouth daily.     • metFORMIN XR (GLUCOPHAGE-XR) 500 mg 24 hr tablet Take 1 tablet (500 mg total) by mouth 2 (two) times a day with meals. 180 tablet 3   • methylphenidate HCl (RITALIN) 20 mg tablet Take 20 mg by mouth 2 (two) times a day.     • omeprazole (PriLOSEC) 20 mg capsule Take 20 mg by mouth daily before breakfast.     • rosuvastatin (CRESTOR) 5 mg tablet Take 10 mg by mouth daily.      • vitamin E 200 unit capsule Take 400 Units by mouth daily.     • fluticasone propionate (FLONASE) 50 mcg/actuation nasal spray USE 2 SPRAYS IN EACH NOSTRIL AT BEDTIME     • LORazepam (ATIVAN) 0.5 mg tablet Take 0.5 mg by mouth daily as needed.     • temazepam (RESTORIL) 15 mg capsule 15 mg nightly.       No current facility-administered medications for this visit.       Review of Systems   Constitutional: Positive for fatigue (feels wiped by the end of the day, asking if she should have other hormone levels checked such as testosterone).   HENT: Negative.    Eyes: Positive for visual disturbance.   Respiratory: Negative.    Cardiovascular: Negative.    Gastrointestinal: Negative.    Endocrine: Negative.    Genitourinary: Negative.         Low libido   Musculoskeletal: Positive for back pain.        Left great toe bunion   Skin: Negative.    Allergic/Immunologic: Positive for environmental allergies.   Neurological: Negative.         Intermittent numbness in toes   Hematological: Negative.   "  Psychiatric/Behavioral: The patient is nervous/anxious.         Busy with her grandchildren. Daughter is a NICU NP. Wants to volunteer to be a \"cuddler\" for infants going through opioid withdrawal.        Physical Exam  Vitals reviewed.   HENT:      Head: Normocephalic.   Cardiovascular:      Rate and Rhythm: Normal rate and regular rhythm.      Pulses: Normal pulses.      Heart sounds: Normal heart sounds.   Pulmonary:      Effort: Pulmonary effort is normal.      Breath sounds: Normal breath sounds.   Abdominal:      General: Abdomen is flat. Bowel sounds are normal.      Palpations: Abdomen is soft.   Musculoskeletal:         General: Normal range of motion.      Cervical back: Normal range of motion.   Skin:     General: Skin is warm and dry.   Neurological:      General: No focal deficit present.      Mental Status: She is alert.   Psychiatric:         Mood and Affect: Mood normal.          Lab Results   Component Value Date    GLUCOSE 134 (H) 04/08/2022    CALCIUM 9.2 04/08/2022     04/08/2022    K 3.9 04/08/2022    CO2 30 04/08/2022     04/08/2022    BUN 24 04/08/2022    CREATININE 0.56 (L) 04/08/2022      Lab Results   Component Value Date    HGBA1C 6.1 (H) 04/08/2022      No results found for: CHOL  No results found for: HDL  No results found for: LDLCALC  No results found for: TRIG  No results found for: CHOLHDL   Lab Results   Component Value Date    MICROALBUR 0.4 07/09/2021      Lab Results   Component Value Date    TSH 2.82 04/08/2022     1. Type 2 diabetes mellitus with other specified complication, without long-term current use of insulin (CMS/[facility])  Glycemic control remains at goal; A1c 6.1%.  Recommend continue Metformin  mg p.o. BID.  Reinforced continued dietary and lifestyle modification.  Reviewed safe glycemic goals and targets.  Asked patient to check her blood sugar every other day before breakfast.  Labs in 3 months.    2. Primary hypertension  BP in office today at " goal.  She is on amlodipine and lisinopril.  Further management per PCP    3. Other hyperlipidemia  No lipid panel available for review.  She is on rosuvastatin 5 mg p.o. daily.    4. Subclinical hypothyroidism  TSH and free T4 within normal range.  Patient has been off thyroid replacement for 2 years.    Discussed with patient it was thought her uncontrolled PEREZ was causing her fatigue and not her thyroid.  In the past, when she was taking thyroid replacement, she continued to have fatigue symptoms.  Discussed I did not feel comfortable initiating levothyroxine at this time due to levels being within range. Continue to monitor every 6 months.    5. Thyroid nodule  Ultrasound of thyroid obtained on 1/9/2018 revealed 0.5 cm left thyroid nodule.  No FNA indicated.  No compressive symptoms.  I asked her to obtain repeat thyroid ultrasound at her convenience.  Discuss results at next visit.        This document was created using Dragon software.  Please excuse any spelling or grammatical errors.

## 2022-04-14 NOTE — PATIENT INSTRUCTIONS
Obtain thyroid ultrasound    Same medications.    Labs in 3 months.          Thank you for scheduling an appointment with me today!    I hope that we have answered all of your questions and considered your problems.      If you have any issues before your next appointment, please let me know.     You may call [phone] for any questions or concerns.     Sincerely,    [facility]

## 2022-04-19 ENCOUNTER — HOSPITAL ENCOUNTER (OUTPATIENT)
Dept: RADIOLOGY | Facility: HOSPITAL | Age: 77
Discharge: HOME | End: 2022-04-19
Attending: OBSTETRICS & GYNECOLOGY
Payer: MEDICARE

## 2022-04-19 ENCOUNTER — TRANSCRIBE ORDERS (OUTPATIENT)
Dept: RADIOLOGY | Facility: HOSPITAL | Age: 77
End: 2022-04-19

## 2022-04-19 DIAGNOSIS — Z12.31 ENCOUNTER FOR SCREENING MAMMOGRAM FOR MALIGNANT NEOPLASM OF BREAST: ICD-10-CM

## 2022-04-19 DIAGNOSIS — Z12.31 ENCOUNTER FOR SCREENING MAMMOGRAM FOR MALIGNANT NEOPLASM OF BREAST: Primary | ICD-10-CM

## 2022-04-19 PROCEDURE — 77067 SCR MAMMO BI INCL CAD: CPT

## 2022-06-23 ENCOUNTER — TRANSCRIBE ORDERS (OUTPATIENT)
Dept: SLEEP MEDICINE | Facility: HOSPITAL | Age: 77
End: 2022-06-23

## 2022-06-23 DIAGNOSIS — G47.33 OBSTRUCTIVE SLEEP APNEA (ADULT) (PEDIATRIC): Primary | ICD-10-CM

## 2022-07-06 ENCOUNTER — HOSPITAL ENCOUNTER (OUTPATIENT)
Dept: SLEEP MEDICINE | Facility: HOSPITAL | Age: 77
Discharge: HOME | End: 2022-07-06
Attending: PSYCHIATRY & NEUROLOGY
Payer: MEDICARE

## 2022-07-06 ENCOUNTER — HOSPITAL ENCOUNTER (OUTPATIENT)
Dept: RADIOLOGY | Facility: HOSPITAL | Age: 77
Discharge: HOME | End: 2022-07-06
Attending: NURSE PRACTITIONER
Payer: MEDICARE

## 2022-07-06 DIAGNOSIS — E04.1 THYROID NODULE: ICD-10-CM

## 2022-07-06 DIAGNOSIS — G47.33 OBSTRUCTIVE SLEEP APNEA (ADULT) (PEDIATRIC): ICD-10-CM

## 2022-07-06 PROCEDURE — 95811 POLYSOM 6/>YRS CPAP 4/> PARM: CPT

## 2022-07-06 PROCEDURE — 76536 US EXAM OF HEAD AND NECK: CPT

## 2022-07-07 VITALS — HEART RATE: 61 BPM | BODY MASS INDEX: 24.66 KG/M2 | WEIGHT: 148 LBS | HEIGHT: 65 IN | OXYGEN SATURATION: 96 %

## 2022-07-14 ENCOUNTER — OFFICE VISIT (OUTPATIENT)
Dept: ENDOCRINOLOGY | Facility: CLINIC | Age: 77
End: 2022-07-14
Payer: MEDICARE

## 2022-07-14 VITALS
RESPIRATION RATE: 16 BRPM | HEIGHT: 65 IN | SYSTOLIC BLOOD PRESSURE: 138 MMHG | OXYGEN SATURATION: 99 % | DIASTOLIC BLOOD PRESSURE: 78 MMHG | BODY MASS INDEX: 25.16 KG/M2 | WEIGHT: 151 LBS | HEART RATE: 80 BPM

## 2022-07-14 DIAGNOSIS — E78.49 OTHER HYPERLIPIDEMIA: ICD-10-CM

## 2022-07-14 DIAGNOSIS — E11.69 TYPE 2 DIABETES MELLITUS WITH OTHER SPECIFIED COMPLICATION, UNSPECIFIED WHETHER LONG TERM INSULIN USE (CMS/HCC): ICD-10-CM

## 2022-07-14 DIAGNOSIS — E03.8 SUBCLINICAL HYPOTHYROIDISM: ICD-10-CM

## 2022-07-14 DIAGNOSIS — E04.1 THYROID NODULE: ICD-10-CM

## 2022-07-14 DIAGNOSIS — E11.69 TYPE 2 DIABETES MELLITUS WITH OTHER SPECIFIED COMPLICATION, WITHOUT LONG-TERM CURRENT USE OF INSULIN: Primary | ICD-10-CM

## 2022-07-14 LAB
EXPIRATION DATE: NORMAL
GLUCOSE BLOOD, POC: 135
Lab: NORMAL
POCT MANUFACTURER: NORMAL

## 2022-07-14 PROCEDURE — G8752 SYS BP LESS 140: HCPCS | Performed by: INTERNAL MEDICINE

## 2022-07-14 PROCEDURE — 82962 GLUCOSE BLOOD TEST: CPT | Performed by: INTERNAL MEDICINE

## 2022-07-14 PROCEDURE — G8754 DIAS BP LESS 90: HCPCS | Performed by: INTERNAL MEDICINE

## 2022-07-14 PROCEDURE — 99214 OFFICE O/P EST MOD 30 MIN: CPT | Performed by: INTERNAL MEDICINE

## 2022-07-14 RX ORDER — LEVOTHYROXINE SODIUM 25 UG/1
25 TABLET ORAL DAILY
Qty: 90 TABLET | Refills: 1 | Status: SHIPPED | OUTPATIENT
Start: 2022-07-14 | End: 2022-10-18

## 2022-07-14 RX ORDER — METFORMIN HYDROCHLORIDE 500 MG/1
500 TABLET, EXTENDED RELEASE ORAL 2 TIMES DAILY WITH MEALS
Qty: 180 TABLET | Refills: 3 | Status: SHIPPED | OUTPATIENT
Start: 2022-07-14 | End: 2022-10-18 | Stop reason: SDUPTHER

## 2022-07-14 NOTE — PROGRESS NOTES
Yusra Walters is a 77 y.o. female with a past medical history of type 2 diabetes, subclinical hypothyroidism, thyroid nodule, hypertension, hyperlipidemia, ADD, and anxiety who is here today for routine follow up visit for diabetes and thyroid management.    Previous Endo - Dr Beth and Demetrius Millan     Last office visit: with Dr. Suarez. (7/14/2021)    LOV with MISTI Alarcon in 4/2022    Interval H/O   Tiredness   Dr Ice (PCP) started pt on synthroid 50 mcg daily for 2 weeks   Pt did not feel better and stopped the medication   Comes with folder with her TSH levels   And wants to know if it ok to go back to LT4   Will be getting cpap pretty soon     ADD -seeing neurologist   Also seeing psychiatrist     Home diabetes regimen:  Metformin  mg p.o. twice a day    Tolerating this with no GI side effects.     Failed medications:  Immediate release Metformin-GI side effects    SMBG:  Not checking consistently.  Fasting levels between 130-150  Denies hypoglycemia    Diet:  Balanced most the time    Up-to-date with ophthalmology visit.  Next office visit in April 2022.  Reports blurred vision.  No reported retinopathy.    She does not see podiatrist.  She self checks feet.  No wounds.  Some remittent numbness in toes.  No pain.    History of subclinical hypothyroidism:  Weaned off levothyroxine 2/2020.  Reports chronic fatigue and decreased libido.  TSH consistently in range.  Per Dr. Suarez's last office note, fatigue thought to be attributed to uncontrolled PEREZ (patient is unable to tolerate CPAP).    History of thyroid nodule:  Ultrasound of thyroid obtained on 1/9/2018 revealed 0.5 cm left thyroid nodule.  No FNA indicated.       Vitals:    07/14/22 0906   BP: 138/78   Pulse: 80   Resp: 16   SpO2: 99%        Current Outpatient Medications   Medication Sig Dispense Refill   • amLODIPine (NORVASC) 10 mg tablet Take 10 mg by mouth daily.     • blood sugar diagnostic strip To tests 4x daily 200  strip 11   • busPIRone (BUSPAR) 5 mg tablet Take 10 mg by mouth as needed.      • calcium carbonate-vitamin D3 500 mg(1,250mg) -200 unit per tablet Take 1 tablet by mouth daily.     • cholecalciferol, vitamin D3, 1,000 unit tablet Take 2,000 Units by mouth daily.     • ezetimibe (ZETIA) 10 mg tablet 10 mg nightly.     • FLUoxetine (PROzac) 20 mg capsule Take 20 mg by mouth 2 (two) times a day.      • fluticasone propionate (FLONASE) 50 mcg/actuation nasal spray USE 2 SPRAYS IN EACH NOSTRIL AT BEDTIME     • FREESTYLE LITE STRIPS strip Check blood sugars 4 X a day 400 strip 6   • lancets (BD ULTRA FINE LANCETS) 33 gauge misc Check blood sugars 4x a day 400 each 6   • lisinopril (PRINIVIL) 5 mg tablet Take 5 mg by mouth daily.     • LORazepam (ATIVAN) 0.5 mg tablet Take 0.5 mg by mouth daily as needed.     • metFORMIN XR (GLUCOPHAGE-XR) 500 mg 24 hr tablet Take 1 tablet (500 mg total) by mouth 2 (two) times a day with meals. 180 tablet 3   • methylphenidate HCl (RITALIN) 20 mg tablet Take 10 mg by mouth daily.     • omeprazole (PriLOSEC) 20 mg capsule Take 20 mg by mouth daily before breakfast.     • rosuvastatin (CRESTOR) 5 mg tablet Take 10 mg by mouth daily.      • temazepam (RESTORIL) 15 mg capsule 15 mg nightly as needed.     • vitamin E 200 unit capsule Take 400 Units by mouth daily.     • busPIRone (BUSPAR) 10 mg tablet Take 10 mg by mouth 2 times daily. As needed        No current facility-administered medications for this visit.       ROS:   No polyuria, polydipsia, dry mouth  No nausea, vomiting, abdominal pain   no chest pain   no shortness of breath  No blurry vision    14 point ROS is negative except as indicated above.    Vitals:    07/14/22 0906   BP: 138/78   Pulse: 80   Resp: 16   SpO2: 99%        Wt Readings from Last 3 Encounters:   07/14/22 68.5 kg (151 lb)   07/07/22 67.1 kg (148 lb)   04/14/22 68 kg (150 lb)            Physical Exam     Lab Results   Component Value Date    GLUCOSE 134 (H)  04/08/2022    CALCIUM 9.2 04/08/2022     04/08/2022    K 3.9 04/08/2022    CO2 30 04/08/2022     04/08/2022    BUN 24 04/08/2022    CREATININE 0.56 (L) 04/08/2022      Lab Results   Component Value Date    HGBA1C 6.1 (H) 04/08/2022      No results found for: CHOL  No results found for: HDL  No results found for: LDLCALC  No results found for: TRIG  No results found for: CHOLHDL   Lab Results   Component Value Date    MICROALBUR 0.4 07/09/2021      Lab Results   Component Value Date    TSH 2.82 04/08/2022   TPO/TG Ab - negative in the past   A1C 6.4 (7/2022)    Results for orders placed during the hospital encounter of 07/06/22    ULTRASOUND THYROID [[id number]]    Narrative  CLINICAL HISTORY:   History of hypothyroidism and a thyroid nodule.    COMPARISON:  1/9/2019.    COMMENT:  Gray-scale and Doppler ultrasound examination of the thyroid gland was performed  and compared to prior studies.    Right Lobe: The right lobe demonstrates overall normal echogenicity and measures  3.6 x 1.1 x 1.4 cm. There are no nodules.  There is overall normal Doppler  signal.    Left Lobe: The left lobe demonstrates overall normal echogenicity and measures  4.4 x 0.7 x 1.0 cm. There are no nodules.  There is overall normal Doppler  signal.    Isthmus: The isthmus demonstrates overall normal echogenicity, measuring 0.2 cm  in thickness. There is redemonstration of a subcentimeter nodule along its left  aspect.    Nodule # 1  Location: Left  Size: 0.7 x 0.4 x 0.6 cm  Composition: Solid or almost completely solid (2 points)  Echogenicity: Hypoechoic (2 points)  Shape: Wider-than-tall (0 points)  Margin: Smooth (0 points)  Echogenic foci: None or large comet-tail artifacts (0 points)  Comparison: No significant interval change allowing differences in measurement  technique.  TI-RADS category: TR4 (4-6 points): Moderately suspicious.    --    Impression  No significant interval change in a subcentimeter nodule in the isthmus  allowing  differences in measurement technique.    ----------------------------------------------------------------------------  TI-RADS recommendations from ACR White paper 2017  ---  TI-RADS categories:  TR 1(0 points): Benign.  Recommendation: No fine-needle aspiration.  TR 2 (2 points): Not suspicious.  Recommendation: No fine-needle aspiration.  TR 3 (3 points): Mildly suspicious.  Recommendations:  Follow-up ultrasound in 1 year for nodules 1.5 cm or larger  Biopsy for nodules 2.5 cm or larger  TR 4 (4-6 points): Moderately suspicious.  Recommendations:  Follow-up ultrasound in 1 year for nodules 1 cm or larger  Biopsy for nodules 1.5 cm or larger  TR 5 (7 or more points): Highly suspicious.  Follow-up ultrasound in 1 year for nodules 0.5 cm or larger  Biopsy for nodules 1 cm or larger        1. Type 2 diabetes mellitus with other specified complication, without long-term current use of insulin (CMS/[facility])  Glycemic control remains at goal; A1c 6.1%.  Recommend continue Metformin  mg p.o. BID.  Reinforced continued dietary and lifestyle modification.  Reviewed safe glycemic goals and targets.  Asked patient to check her blood sugar every other day before breakfast.  Labs in 3 months.    2. Primary hypertension  BP in office today at goal.  She is on amlodipine and lisinopril.  Defer to PCP    3. Other hyperlipidemia  No lipid panel available for review.  She is on rosuvastatin 5 mg p.o. daily.    4. Subclinical hypothyroidism  TSH and free T4 within normal range.  Patient has been off thyroid replacement for 2 years.      Pt is asking to for a trial of levothyroxine to see if it would help her feel better.     Explained to pt that is not clinically indicated    She wants to do a trial to see if it would improve her s/s of tiredness    Start LT4 - 25 mcg daily     Levothyroxine should be taken daily on an empty stomach 30-60 minutes before breakfast and  from supplements such as multivitamins,  calcium, and iron by at least four hours.  Antacids, such as Nexium, Prilosec, Protonix, should be taken at least one hour apart from levothyroxine and caffeine should be avoided for 30-60 minutes following levothyroxine.      5. Thyroid nodule  Reviewed most recent thryoid U/S - stable   No FNA indicated.  No compressive symptoms.  Next thyroid U/S due - 7/2023      Spent 60 min - reviewing records, examination, reviewing results, counseling and communicating results and arranging follow up plan.

## 2022-08-26 ENCOUNTER — TELEPHONE (OUTPATIENT)
Dept: SCHEDULING | Facility: CLINIC | Age: 77
End: 2022-08-26
Payer: MEDICARE

## 2022-08-26 NOTE — TELEPHONE ENCOUNTER
New Patient Appointment Request    Name of caller: Yusra Walters    Reason for Visit: cardiac eval // fatigue // wants cardiologist in closer location    Insurance: Medicare    Insurance ID #: [id number]    Recent Procedures: None    Referred by: Self    Previous Cardiologist name and phone number: Dr. Durga Rangel    Best contact number: [phone] (home)     Additional notes: Pt is scheduled for 10/24

## 2022-08-26 NOTE — TELEPHONE ENCOUNTER
New Patient Appointment Request    Name of caller: Yusra Walters    Reason for Visit: cardiac eval // fatigue // wants cardiologist in closer location    Insurance: Medicare    Insurance ID #: [id number]    Recent Procedures: None    Referred by: Self    Previous Cardiologist name and phone number: Dr. Durga Rangel    Best contact number: [phone] (home)     Additional notes: Pt denied first available, would like to be seen earlier.

## 2022-08-26 NOTE — TELEPHONE ENCOUNTER
Lm to see if patient would like an appointment with Dr Valentino   9/6/22 at 5:15 pm no available appointments with Dr Crowder in time frame.

## 2022-10-14 ENCOUNTER — TELEPHONE (OUTPATIENT)
Dept: ENDOCRINOLOGY | Facility: CLINIC | Age: 77
End: 2022-10-14
Payer: MEDICARE

## 2022-10-18 ENCOUNTER — OFFICE VISIT (OUTPATIENT)
Dept: ENDOCRINOLOGY | Facility: CLINIC | Age: 77
End: 2022-10-18
Payer: MEDICARE

## 2022-10-18 VITALS
HEART RATE: 89 BPM | DIASTOLIC BLOOD PRESSURE: 68 MMHG | SYSTOLIC BLOOD PRESSURE: 124 MMHG | OXYGEN SATURATION: 96 % | WEIGHT: 155 LBS | HEIGHT: 65 IN | BODY MASS INDEX: 25.83 KG/M2 | RESPIRATION RATE: 18 BRPM

## 2022-10-18 DIAGNOSIS — E78.49 OTHER HYPERLIPIDEMIA: ICD-10-CM

## 2022-10-18 DIAGNOSIS — E11.69 TYPE 2 DIABETES MELLITUS WITH OTHER SPECIFIED COMPLICATION, UNSPECIFIED WHETHER LONG TERM INSULIN USE (CMS/HCC): ICD-10-CM

## 2022-10-18 DIAGNOSIS — E04.1 THYROID NODULE: ICD-10-CM

## 2022-10-18 DIAGNOSIS — E11.69 TYPE 2 DIABETES MELLITUS WITH OTHER SPECIFIED COMPLICATION, WITHOUT LONG-TERM CURRENT USE OF INSULIN: Primary | ICD-10-CM

## 2022-10-18 LAB
EXPIRATION DATE: ABNORMAL
GLUCOSE BLOOD, POC: 159
Lab: ABNORMAL
POCT MANUFACTURER: ABNORMAL

## 2022-10-18 PROCEDURE — 99214 OFFICE O/P EST MOD 30 MIN: CPT | Performed by: INTERNAL MEDICINE

## 2022-10-18 PROCEDURE — 82962 GLUCOSE BLOOD TEST: CPT | Performed by: INTERNAL MEDICINE

## 2022-10-18 PROCEDURE — G8752 SYS BP LESS 140: HCPCS | Performed by: INTERNAL MEDICINE

## 2022-10-18 PROCEDURE — G8754 DIAS BP LESS 90: HCPCS | Performed by: INTERNAL MEDICINE

## 2022-10-18 RX ORDER — OFLOXACIN 3 MG/ML
SOLUTION/ DROPS OPHTHALMIC
COMMUNITY
Start: 2022-09-28

## 2022-10-18 RX ORDER — METFORMIN HYDROCHLORIDE 500 MG/1
500 TABLET, EXTENDED RELEASE ORAL 2 TIMES DAILY WITH MEALS
Qty: 180 TABLET | Refills: 3 | Status: SHIPPED | OUTPATIENT
Start: 2022-10-18 | End: 2023-10-18

## 2022-10-18 RX ORDER — KETOROLAC TROMETHAMINE 5 MG/ML
SOLUTION OPHTHALMIC
COMMUNITY
Start: 2022-09-30

## 2022-10-18 RX ORDER — PREDNISOLONE ACETATE 10 MG/ML
SUSPENSION/ DROPS OPHTHALMIC
COMMUNITY
Start: 2022-09-28

## 2022-10-18 RX ORDER — METHYLPHENIDATE HYDROCHLORIDE 20 MG/1
CAPSULE, EXTENDED RELEASE ORAL
COMMUNITY
Start: 2022-08-09

## 2022-10-18 NOTE — PROGRESS NOTES
Yusra Walters is a 77 y.o. female with a past medical history of type 2 diabetes, subclinical hypothyroidism, thyroid nodule, hypertension, hyperlipidemia, ADD, and anxiety who is here today for routine follow up visit for diabetes and thyroid management.    Previous Endo - Dr Beth and Demetrius WATTS with me 7/2022    Interval H/O   Tiredness is better after B12 injections and Ferrous sulphate tablets     ferrtin levels low and getting better now     Started LT4 25 mcg LOV with me and been taking it 3-4 days a week.    On CPAP now     ADD -seeing neurologist   Also seeing psychiatrist     Home diabetes regimen:  Metformin  mg p.o. twice a day    Tolerating this with no GI side effects.     Failed medications:  Immediate release Metformin-GI side effects    SMBG:  Not checking consistently.  Fasting levels between 130-150  Denies hypoglycemia    Diet:  Balanced most the time    Up-to-date with ophthalmology visit.  Next office visit in April 2022.  Reports blurred vision.  No reported retinopathy.    She does not see podiatrist.  She self checks feet.  No wounds.  Some remittent numbness in toes.  No pain.    History of subclinical hypothyroidism:  Weaned off levothyroxine 2/2020.  Reports chronic fatigue and decreased libido.  TSH consistently in range.  Per Dr. Suarez's last office note, fatigue thought to be attributed to uncontrolled PEREZ (patient is unable to tolerate CPAP).    History of thyroid nodule:  Ultrasound of thyroid obtained on 1/9/2018 revealed 0.5 cm left thyroid nodule.  No FNA indicated.       Vitals:    10/18/22 1005   BP: 124/68   Pulse: 89   Resp: 18   SpO2: 96%        Current Outpatient Medications   Medication Sig Dispense Refill    amLODIPine (NORVASC) 10 mg tablet Take 10 mg by mouth daily.      blood sugar diagnostic strip To tests 4x daily 200 strip 11    calcium carbonate-vitamin D3 500 mg(1,250mg) -200 unit per tablet Take 1 tablet by mouth daily.       cholecalciferol, vitamin D3, 1,000 unit tablet Take 2,000 Units by mouth daily.      ezetimibe (ZETIA) 10 mg tablet 10 mg nightly.      FLUoxetine (PROzac) 20 mg capsule Take 20 mg by mouth 2 (two) times a day.       fluticasone propionate (FLONASE) 50 mcg/actuation nasal spray daily as needed.      ketorolac (ACULAR) 0.5 % ophthalmic solution INSTILL ONE DROP INTO THE AFFECTED EYE 4 TIMES A DAY.      lancets (BD ULTRA FINE LANCETS) 33 gauge misc Check blood sugars 4x a day 400 each 6    levothyroxine (SYNTHROID) 25 mcg tablet Take 1 tablet (25 mcg total) by mouth daily. 90 tablet 1    lisinopril (PRINIVIL) 5 mg tablet Take 5 mg by mouth daily.      LORazepam (ATIVAN) 0.5 mg tablet Take 0.5 mg by mouth daily as needed.      metFORMIN XR (GLUCOPHAGE-XR) 500 mg 24 hr tablet Take 1 tablet (500 mg total) by mouth 2 (two) times a day with meals. 180 tablet 3    methylphenidate LA (RITALIN LA) 20 mg 24 hr capsule TAKE 1 CAPSULE BY MOUTH IN THE MORNING      ofloxacin (OCUFLOX) 0.3 % ophthalmic solution INSTILL ONE DROP INTO THE AFFECTED EYE 4 TIMES PER DAY      omeprazole (PriLOSEC) 20 mg capsule Take 20 mg by mouth daily before breakfast.      prednisoLONE acetate (PRED FORTE) 1 % ophthalmic suspension INSTILL ONE DROP INTO THE AFFECTED EYE 4 TIMES A DAY.      rosuvastatin (CRESTOR) 5 mg tablet Take 10 mg by mouth daily.       temazepam (RESTORIL) 15 mg capsule 15 mg nightly as needed.      vitamin E 200 unit capsule Take 400 Units by mouth daily.       No current facility-administered medications for this visit.       ROS:   No polyuria, polydipsia, dry mouth  No nausea, vomiting, abdominal pain   no chest pain   no shortness of breath  No blurry vision    14 point ROS is negative except as indicated above.    Vitals:    10/18/22 1005   BP: 124/68   Pulse: 89   Resp: 18   SpO2: 96%        Wt Readings from Last 3 Encounters:   10/18/22 70.3 kg (155 lb)   07/14/22 68.5 kg (151 lb)   07/07/22 67.1 kg (148  lb)            Physical Exam     Lab Results   Component Value Date    GLUCOSE 134 (H) 04/08/2022    CALCIUM 9.2 04/08/2022     04/08/2022    K 3.9 04/08/2022    CO2 30 04/08/2022     04/08/2022    BUN 24 04/08/2022    CREATININE 0.56 (L) 04/08/2022      Lab Results   Component Value Date    HGBA1C 6.1 (H) 04/08/2022      No results found for: CHOL  No results found for: HDL  No results found for: LDLCALC  No results found for: TRIG  No results found for: CHOLHDL   Lab Results   Component Value Date    MICROALBUR 0.4 07/09/2021      Lab Results   Component Value Date    TSH 2.82 04/08/2022   TPO/TG Ab - negative in the past   A1C 6.4 (7/2022)    Results for orders placed during the hospital encounter of 07/06/22    ULTRASOUND THYROID [[id number]]    Narrative  CLINICAL HISTORY:   History of hypothyroidism and a thyroid nodule.    COMPARISON:  1/9/2019.    COMMENT:  Gray-scale and Doppler ultrasound examination of the thyroid gland was performed  and compared to prior studies.    Right Lobe: The right lobe demonstrates overall normal echogenicity and measures  3.6 x 1.1 x 1.4 cm. There are no nodules.  There is overall normal Doppler  signal.    Left Lobe: The left lobe demonstrates overall normal echogenicity and measures  4.4 x 0.7 x 1.0 cm. There are no nodules.  There is overall normal Doppler  signal.    Isthmus: The isthmus demonstrates overall normal echogenicity, measuring 0.2 cm  in thickness. There is redemonstration of a subcentimeter nodule along its left  aspect.    Nodule # 1  Location: Left  Size: 0.7 x 0.4 x 0.6 cm  Composition: Solid or almost completely solid (2 points)  Echogenicity: Hypoechoic (2 points)  Shape: Wider-than-tall (0 points)  Margin: Smooth (0 points)  Echogenic foci: None or large comet-tail artifacts (0 points)  Comparison: No significant interval change allowing differences in measurement  technique.  TI-RADS category: TR4 (4-6 points): Moderately  suspicious.    --    Impression  No significant interval change in a subcentimeter nodule in the isthmus allowing  differences in measurement technique.    ----------------------------------------------------------------------------  TI-RADS recommendations from ACR White paper 2017  ---  TI-RADS categories:  TR 1(0 points): Benign.  Recommendation: No fine-needle aspiration.  TR 2 (2 points): Not suspicious.  Recommendation: No fine-needle aspiration.  TR 3 (3 points): Mildly suspicious.  Recommendations:  Follow-up ultrasound in 1 year for nodules 1.5 cm or larger  Biopsy for nodules 2.5 cm or larger  TR 4 (4-6 points): Moderately suspicious.  Recommendations:  Follow-up ultrasound in 1 year for nodules 1 cm or larger  Biopsy for nodules 1.5 cm or larger  TR 5 (7 or more points): Highly suspicious.  Follow-up ultrasound in 1 year for nodules 0.5 cm or larger  Biopsy for nodules 1 cm or larger     (10/2022)   A1C - 6.6  TSH 1.66  LDL 96    1. Type 2 diabetes mellitus with other specified complication, without long-term current use of insulin (CMS/[facility])  Glycemic control slight worsening, however remains at goal; A1c 6.1%. --> 6.6  Recommend continue Metformin  mg p.o. BID.  Reinforced continued dietary and lifestyle modification.  Reviewed safe glycemic goals and targets.  No need to check BS at home   Labs in 3 months.    2. Primary hypertension  BP in office today at goal.  She is on amlodipine and lisinopril.  Defer to PCP    3. Other hyperlipidemia  LDL 96  She is on rosuvastatin 5 mg p.o. daily.    4. Subclinical hypothyroidism  TSH and free T4 within normal range.  Stop levothyroxine     Explained and reassured to pt that is not clinically indicated    Explained to pt that her tiredness is likely from b12 and iron deficiency       5. Thyroid nodule  Reviewed most recent thryoid U/S - stable   No FNA indicated.  No compressive symptoms.  Next thyroid U/S due - 7/2023

## 2023-02-08 ENCOUNTER — TELEPHONE (OUTPATIENT)
Dept: PRIMARY CARE | Facility: CLINIC | Age: 78
End: 2023-02-08

## 2023-03-03 ENCOUNTER — APPOINTMENT (OUTPATIENT)
Dept: URBAN - METROPOLITAN AREA CLINIC 203 | Age: 78
Setting detail: DERMATOLOGY
End: 2023-03-08

## 2023-03-03 DIAGNOSIS — D69.2 OTHER NONTHROMBOCYTOPENIC PURPURA: ICD-10-CM

## 2023-03-03 DIAGNOSIS — L57.0 ACTINIC KERATOSIS: ICD-10-CM

## 2023-03-03 DIAGNOSIS — L57.8 OTHER SKIN CHANGES DUE TO CHRONIC EXPOSURE TO NONIONIZING RADIATION: ICD-10-CM

## 2023-03-03 PROCEDURE — OTHER REASSURANCE: OTHER

## 2023-03-03 PROCEDURE — OTHER SUNSCREEN RECOMMENDATIONS: OTHER

## 2023-03-03 PROCEDURE — OTHER COUNSELING: OTHER

## 2023-03-03 PROCEDURE — 99213 OFFICE O/P EST LOW 20 MIN: CPT | Mod: 25

## 2023-03-03 PROCEDURE — OTHER LIQUID NITROGEN: OTHER

## 2023-03-03 PROCEDURE — 17000 DESTRUCT PREMALG LESION: CPT

## 2023-03-03 ASSESSMENT — LOCATION ZONE DERM
LOCATION ZONE: NECK
LOCATION ZONE: TRUNK
LOCATION ZONE: ARM

## 2023-03-03 ASSESSMENT — LOCATION SIMPLE DESCRIPTION DERM
LOCATION SIMPLE: LEFT BREAST
LOCATION SIMPLE: POSTERIOR NECK
LOCATION SIMPLE: RIGHT FOREARM
LOCATION SIMPLE: LEFT FOREARM

## 2023-03-03 ASSESSMENT — LOCATION DETAILED DESCRIPTION DERM
LOCATION DETAILED: RIGHT LATERAL TRAPEZIAL NECK
LOCATION DETAILED: LEFT PROXIMAL DORSAL FOREARM
LOCATION DETAILED: RIGHT PROXIMAL RADIAL DORSAL FOREARM
LOCATION DETAILED: LEFT MEDIAL BREAST 10-11:00 REGION
LOCATION DETAILED: LEFT MEDIAL BREAST 11-12:00 REGION

## 2023-03-03 ASSESSMENT — PAIN INTENSITY VAS: HOW INTENSE IS YOUR PAIN 0 BEING NO PAIN, 10 BEING THE MOST SEVERE PAIN POSSIBLE?: NO PAIN

## 2023-03-03 NOTE — PROCEDURE: LIQUID NITROGEN
Render Note In Bullet Format When Appropriate: No
Post-Care Instructions: I reviewed with the patient in detail post-care instructions. Patient is to wear sunprotection, and avoid picking at any of the treated lesions. Pt may apply Vaseline to crusted or scabbing areas.
Detail Level: Detailed
Show Aperture Variable?: Yes
Consent: The patient's consent was obtained including but not limited to risks of crusting, scabbing, blistering, scarring, darker or lighter pigmentary change, recurrence, incomplete removal and infection.
Duration Of Freeze Thaw-Cycle (Seconds): 0
Application Tool (Optional): Liquid Nitrogen Sprayer

## 2023-04-14 ENCOUNTER — TELEPHONE (OUTPATIENT)
Dept: ENDOCRINOLOGY | Facility: CLINIC | Age: 78
End: 2023-04-14
Payer: MEDICARE

## 2023-05-12 ENCOUNTER — TRANSCRIBE ORDERS (OUTPATIENT)
Dept: SCHEDULING | Age: 78
End: 2023-05-12

## 2023-05-12 DIAGNOSIS — N64.4 MASTODYNIA: Primary | ICD-10-CM

## 2023-06-21 ENCOUNTER — HOSPITAL ENCOUNTER (OUTPATIENT)
Dept: RADIOLOGY | Facility: HOSPITAL | Age: 78
Discharge: HOME | End: 2023-06-21
Attending: OBSTETRICS & GYNECOLOGY
Payer: MEDICARE

## 2023-06-21 ENCOUNTER — TELEPHONE (OUTPATIENT)
Dept: ENDOCRINOLOGY | Facility: CLINIC | Age: 78
End: 2023-06-21
Payer: MEDICARE

## 2023-06-21 DIAGNOSIS — N64.4 MASTODYNIA: ICD-10-CM

## 2023-06-21 PROCEDURE — 77066 DX MAMMO INCL CAD BI: CPT

## 2023-06-21 PROCEDURE — 76642 ULTRASOUND BREAST LIMITED: CPT | Mod: RT

## 2023-06-21 NOTE — TELEPHONE ENCOUNTER
Pt called after work hours requesting an appt. Call was returned to pt. Spoke to her  Camron asking him to have her call the office during office hours so we can schedule her appt.

## 2023-06-27 ENCOUNTER — APPOINTMENT (OUTPATIENT)
Dept: URBAN - METROPOLITAN AREA CLINIC 203 | Age: 78
Setting detail: DERMATOLOGY
End: 2023-07-01

## 2023-06-27 DIAGNOSIS — L29.3 ANOGENITAL PRURITUS, UNSPECIFIED: ICD-10-CM

## 2023-06-27 PROCEDURE — OTHER PRESCRIPTION MEDICATION MANAGEMENT: OTHER

## 2023-06-27 PROCEDURE — 99212 OFFICE O/P EST SF 10 MIN: CPT

## 2023-06-27 PROCEDURE — OTHER PRESCRIPTION: OTHER

## 2023-06-27 RX ORDER — TRIAMCINOLONE ACETONIDE 0.25 MG/G
CREAM TOPICAL
Qty: 15 | Refills: 2 | Status: ERX | COMMUNITY
Start: 2023-06-27

## 2023-06-27 RX ORDER — TACROLIMUS 0.3 MG/G
OINTMENT TOPICAL
Qty: 30 | Refills: 0 | Status: ERX | COMMUNITY
Start: 2023-06-27

## 2023-06-27 ASSESSMENT — LOCATION ZONE DERM
LOCATION ZONE: VULVA
LOCATION ZONE: VULVA

## 2023-06-27 ASSESSMENT — LOCATION DETAILED DESCRIPTION DERM
LOCATION DETAILED: LEFT LABIA MINORA
LOCATION DETAILED: LEFT LABIA MINORA
LOCATION DETAILED: RIGHT LABIA MINORA

## 2023-06-27 ASSESSMENT — LOCATION SIMPLE DESCRIPTION DERM
LOCATION SIMPLE: VULVA
LOCATION SIMPLE: VULVA

## 2023-06-27 NOTE — PROCEDURE: PRESCRIPTION MEDICATION MANAGEMENT
Modify Regimen: Triamcinolone sparingly (Limit to Sat/Sun)\\nTacrolimus ointment M-Friday qd
Plan: Discussed possibility of early lichen sclerosis but no skin changes appreciated on exam. Will monitor in 6 weeks
Render In Strict Bullet Format?: No
Detail Level: Zone

## 2024-03-04 ENCOUNTER — APPOINTMENT (OUTPATIENT)
Dept: URBAN - METROPOLITAN AREA CLINIC 203 | Age: 79
Setting detail: DERMATOLOGY
End: 2024-03-06

## 2024-03-04 DIAGNOSIS — L57.8 OTHER SKIN CHANGES DUE TO CHRONIC EXPOSURE TO NONIONIZING RADIATION: ICD-10-CM

## 2024-03-04 DIAGNOSIS — D22 MELANOCYTIC NEVI: ICD-10-CM

## 2024-03-04 DIAGNOSIS — L29.3 ANOGENITAL PRURITUS, UNSPECIFIED: ICD-10-CM

## 2024-03-04 PROBLEM — D22.5 MELANOCYTIC NEVI OF TRUNK: Status: ACTIVE | Noted: 2024-03-04

## 2024-03-04 PROCEDURE — OTHER REASSURANCE: OTHER

## 2024-03-04 PROCEDURE — OTHER PRESCRIPTION MEDICATION MANAGEMENT: OTHER

## 2024-03-04 PROCEDURE — OTHER SUNSCREEN RECOMMENDATIONS: OTHER

## 2024-03-04 PROCEDURE — 99213 OFFICE O/P EST LOW 20 MIN: CPT

## 2024-03-04 PROCEDURE — OTHER COUNSELING: OTHER

## 2024-03-04 ASSESSMENT — LOCATION SIMPLE DESCRIPTION DERM
LOCATION SIMPLE: LEFT UPPER BACK
LOCATION SIMPLE: GROIN
LOCATION SIMPLE: LEFT BREAST

## 2024-03-04 ASSESSMENT — LOCATION DETAILED DESCRIPTION DERM
LOCATION DETAILED: LEFT MEDIAL BREAST 10-11:00 REGION
LOCATION DETAILED: LEFT MEDIAL BREAST 11-12:00 REGION
LOCATION DETAILED: RIGHT SUPRAPUBIC SKIN
LOCATION DETAILED: LEFT MEDIAL UPPER BACK

## 2024-03-04 ASSESSMENT — LOCATION ZONE DERM: LOCATION ZONE: TRUNK

## 2024-03-04 ASSESSMENT — ITCH INTENSITY: HOW SEVERE IS YOUR ITCHING?: 6

## 2024-09-09 ENCOUNTER — TRANSCRIBE ORDERS (OUTPATIENT)
Dept: SCHEDULING | Age: 79
End: 2024-09-09

## 2024-09-09 DIAGNOSIS — N95.9 UNSPECIFIED MENOPAUSAL AND PERIMENOPAUSAL DISORDER: ICD-10-CM

## 2024-09-09 DIAGNOSIS — Z12.31 ENCOUNTER FOR SCREENING MAMMOGRAM FOR MALIGNANT NEOPLASM OF BREAST: Primary | ICD-10-CM

## 2024-09-19 ENCOUNTER — HOSPITAL ENCOUNTER (OUTPATIENT)
Dept: RADIOLOGY | Facility: HOSPITAL | Age: 79
Discharge: HOME | End: 2024-09-19
Attending: OBSTETRICS & GYNECOLOGY
Payer: MEDICARE

## 2024-09-19 DIAGNOSIS — N95.9 UNSPECIFIED MENOPAUSAL AND PERIMENOPAUSAL DISORDER: ICD-10-CM

## 2024-09-19 DIAGNOSIS — Z12.31 ENCOUNTER FOR SCREENING MAMMOGRAM FOR MALIGNANT NEOPLASM OF BREAST: ICD-10-CM

## 2024-09-19 PROCEDURE — 77063 BREAST TOMOSYNTHESIS BI: CPT

## 2024-09-19 PROCEDURE — 77080 DXA BONE DENSITY AXIAL: CPT

## 2024-09-19 PROCEDURE — 77067 SCR MAMMO BI INCL CAD: CPT

## 2024-10-01 ENCOUNTER — HOSPITAL ENCOUNTER (OUTPATIENT)
Facility: HOSPITAL | Age: 79
End: 2024-10-01
Attending: INTERNAL MEDICINE | Admitting: INTERNAL MEDICINE
Payer: MEDICARE

## 2024-11-21 ENCOUNTER — APPOINTMENT (OUTPATIENT)
Dept: URBAN - METROPOLITAN AREA CLINIC 203 | Age: 79
Setting detail: DERMATOLOGY
End: 2024-12-03

## 2024-11-21 DIAGNOSIS — Z71.89 OTHER SPECIFIED COUNSELING: ICD-10-CM

## 2024-11-21 DIAGNOSIS — D18.0 HEMANGIOMA: ICD-10-CM

## 2024-11-21 DIAGNOSIS — L57.8 OTHER SKIN CHANGES DUE TO CHRONIC EXPOSURE TO NONIONIZING RADIATION: ICD-10-CM

## 2024-11-21 DIAGNOSIS — D17 BENIGN LIPOMATOUS NEOPLASM: ICD-10-CM

## 2024-11-21 DIAGNOSIS — L82.1 OTHER SEBORRHEIC KERATOSIS: ICD-10-CM

## 2024-11-21 DIAGNOSIS — D22 MELANOCYTIC NEVI: ICD-10-CM

## 2024-11-21 DIAGNOSIS — L81.4 OTHER MELANIN HYPERPIGMENTATION: ICD-10-CM

## 2024-11-21 PROBLEM — D18.01 HEMANGIOMA OF SKIN AND SUBCUTANEOUS TISSUE: Status: ACTIVE | Noted: 2024-11-21

## 2024-11-21 PROBLEM — D17.1 BENIGN LIPOMATOUS NEOPLASM OF SKIN AND SUBCUTANEOUS TISSUE OF TRUNK: Status: ACTIVE | Noted: 2024-11-21

## 2024-11-21 PROBLEM — D22.5 MELANOCYTIC NEVI OF TRUNK: Status: ACTIVE | Noted: 2024-11-21

## 2024-11-21 PROCEDURE — OTHER PRESCRIPTION MEDICATION MANAGEMENT: OTHER

## 2024-11-21 PROCEDURE — 99213 OFFICE O/P EST LOW 20 MIN: CPT

## 2024-11-21 PROCEDURE — OTHER COUNSELING: OTHER

## 2024-11-21 PROCEDURE — OTHER REASSURANCE: OTHER

## 2024-11-21 PROCEDURE — OTHER SUNSCREEN RECOMMENDATIONS: OTHER

## 2024-11-21 ASSESSMENT — LOCATION DETAILED DESCRIPTION DERM
LOCATION DETAILED: LEFT INFERIOR MEDIAL FOREHEAD
LOCATION DETAILED: RIGHT INFERIOR LATERAL NECK
LOCATION DETAILED: RIGHT MEDIAL FOREHEAD
LOCATION DETAILED: LEFT SUPERIOR UPPER BACK
LOCATION DETAILED: LEFT SUPERIOR MEDIAL UPPER BACK
LOCATION DETAILED: RIGHT MEDIAL SUPERIOR CHEST
LOCATION DETAILED: LEFT LATERAL MALAR CHEEK
LOCATION DETAILED: LEFT RIB CAGE
LOCATION DETAILED: INFERIOR THORACIC SPINE
LOCATION DETAILED: LEFT MEDIAL BREAST 11-12:00 REGION
LOCATION DETAILED: LEFT MEDIAL BREAST 10-11:00 REGION
LOCATION DETAILED: EPIGASTRIC SKIN
LOCATION DETAILED: SUPERIOR THORACIC SPINE

## 2024-11-21 ASSESSMENT — LOCATION SIMPLE DESCRIPTION DERM
LOCATION SIMPLE: LEFT FOREHEAD
LOCATION SIMPLE: UPPER BACK
LOCATION SIMPLE: LEFT BREAST
LOCATION SIMPLE: CHEST
LOCATION SIMPLE: RIGHT ANTERIOR NECK
LOCATION SIMPLE: RIGHT FOREHEAD
LOCATION SIMPLE: LEFT CHEEK
LOCATION SIMPLE: ABDOMEN
LOCATION SIMPLE: LEFT UPPER BACK

## 2024-11-21 ASSESSMENT — LOCATION ZONE DERM
LOCATION ZONE: NECK
LOCATION ZONE: TRUNK
LOCATION ZONE: FACE

## 2024-11-21 NOTE — PROCEDURE: PRESCRIPTION MEDICATION MANAGEMENT
Render In Strict Bullet Format?: No
Detail Level: Zone
Plan: Purchased dermamade retinol in office and tinted elta MD at todays visit

## 2025-07-07 ENCOUNTER — APPOINTMENT (OUTPATIENT)
Dept: URBAN - METROPOLITAN AREA CLINIC 203 | Age: 80
Setting detail: DERMATOLOGY
End: 2025-07-08

## 2025-07-07 DIAGNOSIS — D22 MELANOCYTIC NEVI: ICD-10-CM

## 2025-07-07 DIAGNOSIS — L82.1 OTHER SEBORRHEIC KERATOSIS: ICD-10-CM

## 2025-07-07 DIAGNOSIS — D17 BENIGN LIPOMATOUS NEOPLASM: ICD-10-CM

## 2025-07-07 PROBLEM — D17.1 BENIGN LIPOMATOUS NEOPLASM OF SKIN AND SUBCUTANEOUS TISSUE OF TRUNK: Status: ACTIVE | Noted: 2025-07-07

## 2025-07-07 PROBLEM — D22.5 MELANOCYTIC NEVI OF TRUNK: Status: ACTIVE | Noted: 2025-07-07

## 2025-07-07 PROCEDURE — OTHER REASSURANCE: OTHER

## 2025-07-07 PROCEDURE — 99212 OFFICE O/P EST SF 10 MIN: CPT

## 2025-07-07 PROCEDURE — OTHER COUNSELING: OTHER

## 2025-07-07 PROCEDURE — OTHER DEFER: OTHER

## 2025-07-07 ASSESSMENT — LOCATION DETAILED DESCRIPTION DERM
LOCATION DETAILED: RIGHT INFERIOR UPPER BACK
LOCATION DETAILED: LEFT AXILLARY TAIL OF BREAST
LOCATION DETAILED: RIGHT MID-UPPER BACK
LOCATION DETAILED: LEFT SUPERIOR UPPER BACK
LOCATION DETAILED: GENITALIA

## 2025-07-07 ASSESSMENT — LOCATION ZONE DERM: LOCATION ZONE: TRUNK

## 2025-07-07 ASSESSMENT — LOCATION SIMPLE DESCRIPTION DERM
LOCATION SIMPLE: LEFT UPPER BACK
LOCATION SIMPLE: LEFT BREAST
LOCATION SIMPLE: RIGHT UPPER BACK
LOCATION SIMPLE: GENITALIA